# Patient Record
Sex: FEMALE | Race: WHITE | Employment: OTHER | URBAN - METROPOLITAN AREA
[De-identification: names, ages, dates, MRNs, and addresses within clinical notes are randomized per-mention and may not be internally consistent; named-entity substitution may affect disease eponyms.]

---

## 2019-04-02 ENCOUNTER — TELEPHONE (OUTPATIENT)
Dept: PULMONOLOGY | Age: 69
End: 2019-04-02

## 2019-04-02 ENCOUNTER — OFFICE VISIT (OUTPATIENT)
Dept: PULMONOLOGY | Age: 69
End: 2019-04-02
Payer: MEDICARE

## 2019-04-02 VITALS
HEIGHT: 63 IN | WEIGHT: 244 LBS | BODY MASS INDEX: 43.23 KG/M2 | OXYGEN SATURATION: 95 % | RESPIRATION RATE: 20 BRPM | SYSTOLIC BLOOD PRESSURE: 132 MMHG | DIASTOLIC BLOOD PRESSURE: 84 MMHG | HEART RATE: 77 BPM

## 2019-04-02 DIAGNOSIS — Z87.891 PERSONAL HISTORY OF TOBACCO USE: ICD-10-CM

## 2019-04-02 DIAGNOSIS — R06.09 DYSPNEA ON EXERTION: ICD-10-CM

## 2019-04-02 DIAGNOSIS — R91.1 PULMONARY NODULE: Primary | ICD-10-CM

## 2019-04-02 DIAGNOSIS — R91.8 PULMONARY NODULES: Primary | ICD-10-CM

## 2019-04-02 PROCEDURE — 99204 OFFICE O/P NEW MOD 45 MIN: CPT | Performed by: INTERNAL MEDICINE

## 2019-04-02 PROCEDURE — G8417 CALC BMI ABV UP PARAM F/U: HCPCS | Performed by: INTERNAL MEDICINE

## 2019-04-02 PROCEDURE — 3017F COLORECTAL CA SCREEN DOC REV: CPT | Performed by: INTERNAL MEDICINE

## 2019-04-02 PROCEDURE — 1090F PRES/ABSN URINE INCON ASSESS: CPT | Performed by: INTERNAL MEDICINE

## 2019-04-02 PROCEDURE — G8427 DOCREV CUR MEDS BY ELIG CLIN: HCPCS | Performed by: INTERNAL MEDICINE

## 2019-04-02 NOTE — TELEPHONE ENCOUNTER
Need to obtain imagining from [address]    4/2/19    Assessment:       · RML 8 mm nodule on CT 2/27/19. Grown from CT 11/27/17, however not significantly changed between CT 12/4/18 and PET scan 3/23/19. Negative PET scan 3/23/19- SUV 1. DDx growing granuloma, slow growing bronchogenic carcinoma, metastatic disease. Discussed with IR and TTNBx will be challenging and carries a low yield. · Dyspnea on exertion. Obesity and deconditioning are contributing  · History of L breast cancer 7-10 years ago underwent surgery followed by radiation. · 20 pack year smoking- quit 2015       Plan:       · PFTs and 6MW  · Options of Bx, resection and watchful waiting were discussed with patient. I recommend radiographic follow up CT chest CT chest in 3 months.    · Albuterol 2 puffs Q4-6 hrs PRN

## 2019-04-02 NOTE — COMMUNICATION BODY
Assessment:       · RML 8 mm nodule on CT 2/27/19. Grown from CT 11/27/17, however not significantly changed between CT 12/4/18 and PET scan 3/23/19. Negative PET scan 3/23/19- SUV 1. DDx growing granuloma, slow growing bronchogenic carcinoma, metastatic disease. Discussed with IR and TTNBx will be challenging and carries a low yield. · Dyspnea on exertion. Obesity and deconditioning are contributing  · History of L breast cancer 7-10 years ago underwent surgery followed by radiation. · 20 pack year smoking- quit 2015       Plan:       · PFTs and 6MW  · Options of Bx, resection and watchful waiting were discussed with patient. I recommend radiographic follow up CT chest CT chest in 3 months.    · Albuterol 2 puffs Q4-6 hrs PRN

## 2019-04-02 NOTE — PROGRESS NOTES
[facility]                                                                    CHIEF COMPLAINT: Pulmonary nodule    Consulting provider: Dr. Alex Galicia    HPI:   80years old female had CT chest 12/4/2018 for SOB. CT reviewed by me and showed growing RML nodule. Followed by PET scan 3/23/19 reviewed by me and showed no metabolic activities. History of L breast cancer 7-10 years ago underwent surgery followed by radiation. Quit smoking 4 years ago. Smoked 1 ppd for 20 years. No weight loss. Uses Albuterol PRN. Mild CHILDS, able to walk as far as she wants. Dyspnea worse with exertion and better with resting. No associated cough or wheezes. Past Medical History:   Diagnosis Date    Breast cancer ([address]     Hypercholesteremia        Past Surgical History:        Procedure Laterality Date    KNEE SURGERY Right     THYROIDECTOMY      TOTAL KNEE ARTHROPLASTY  2015       Allergies:  has No Known Allergies. Social History:    TOBACCO:   reports that she quit smoking about 4 years ago. She has a 20.00 pack-year smoking history. She has never used smokeless tobacco.  ETOH:   has no alcohol history on file. Family History:       Problem Relation Age of Onset    Cancer Mother         mutiple myeloma    Cancer Father         colon    Asthma Neg Hx     Diabetes Neg Hx     Emphysema Neg Hx     Heart Failure Neg Hx        Current Medications:  No current outpatient medications on file.       REVIEW OF SYSTEMS:  Constitutional: Negative for fever  HENT: Negative for sore throat  Eyes: Negative for redness   Respiratory: Negative for dyspnea, cough  Cardiovascular: Negative for chest pain  Gastrointestinal: Negative for vomiting, diarrhea   Genitourinary: Negative for hematuria   Musculoskeletal: Negative for arthralgias   Skin: Negative for rash  Neurological: Negative for syncope  Hematological: Negative for adenopathy  Psychiatric/Behavorial: Negative for anxiety      Objective:   PHYSICAL EXAM:    Blood pressure 132/84, pulse 77, resp. rate 20, height 5' 3\" (1.6 m), weight 244 lb (110.7 kg), SpO2 95 %.' on RA  Gen: No distress. Eyes: PERRL. No sclera icterus. No conjunctival injection. ENT: No discharge. Pharynx clear. Neck: Trachea midline. No obvious mass. Resp: No accessory muscle use. No crackles. No wheezes. No rhonchi. No dullness on percussion. Good air entry. CV: Regular rate. Regular rhythm. No murmur or rub. No edema. GI: Non-tender. Non-distended. No hernia. Skin: Warm and dry. No nodule on exposed extremities. Lymph: No cervical LAD. No supraclavicular LAD. M/S: No cyanosis. No joint deformity. No clubbing. Neuro: Awake. Alert. Moves all four extremities. Psych: Oriented x 3. No anxiety. DATA reviewed by me:   CT chest 12/4/18 imaging reviewed by me and showed  Increased size of 7 mm right middle lobe  3 mm right lower lobe  Lingula opacity scarring versus atelectasis    PET scan 3/23/19 imaging reviewed by me and showed  RUL 8 mm nodule with no significant metabolic activities    Assessment:       · RML 8 mm nodule on CT 2/27/19. Grown from CT 11/27/17, however not significantly changed between CT 12/4/18 and PET scan 3/23/19. Negative PET scan 3/23/19- SUV 1. DDx growing granuloma, slow growing bronchogenic carcinoma, metastatic disease. Discussed with IR and TTNBx will be challenging and carries a low yield. · Dyspnea on exertion. Obesity and deconditioning are contributing  · History of L breast cancer 7-10 years ago underwent surgery followed by radiation. · 20 pack year smoking- quit 2015       Plan:       · PFTs and 6MW  · Options of Bx, resection and watchful waiting were discussed with patient. I recommend radiographic follow up CT chest CT chest in 3 months.    · Albuterol 2 puffs Q4-6 hrs PRN

## 2019-04-03 RX ORDER — ALBUTEROL SULFATE 90 UG/1
2 AEROSOL, METERED RESPIRATORY (INHALATION) EVERY 6 HOURS PRN
Qty: 1 INHALER | Refills: 6 | Status: SHIPPED | OUTPATIENT
Start: 2019-04-03 | End: 2020-11-04

## 2019-04-10 ENCOUNTER — HOSPITAL ENCOUNTER (OUTPATIENT)
Dept: PULMONOLOGY | Age: 69
Discharge: HOME | End: 2019-04-10
Payer: MEDICARE

## 2019-04-10 DIAGNOSIS — R91.8 PULMONARY NODULES: ICD-10-CM

## 2019-04-10 PROCEDURE — 94726 PLETHYSMOGRAPHY LUNG VOLUMES: CPT

## 2019-04-10 PROCEDURE — 94640 AIRWAY INHALATION TREATMENT: CPT

## 2019-04-10 PROCEDURE — 94618 PULMONARY STRESS TESTING: CPT

## 2019-04-10 PROCEDURE — 6360000002 HC RX W HCPCS: Performed by: INTERNAL MEDICINE

## 2019-04-10 PROCEDURE — 94060 EVALUATION OF WHEEZING: CPT

## 2019-04-10 PROCEDURE — 94729 DIFFUSING CAPACITY: CPT

## 2019-04-10 RX ORDER — ALBUTEROL SULFATE 2.5 MG/3ML
2.5 SOLUTION RESPIRATORY (INHALATION) ONCE
Status: COMPLETED | OUTPATIENT
Start: 2019-04-10 | End: 2019-04-10

## 2019-04-10 RX ADMIN — ALBUTEROL SULFATE 2.5 MG: 2.5 SOLUTION RESPIRATORY (INHALATION) at 09:54

## 2019-04-11 NOTE — PROCEDURES
[address]                 [address] [id number]                               PULMONARY FUNCTION    PATIENT NAME: Win Norris                :        1950  MED REC NO:   [id number]                          ROOM:  ACCOUNT NO:   [de-identified]                           ADMIT DATE: 04/10/2019  PROVIDER:     Kirsten Carrizales MD    DATE OF PROCEDURE:  04/10/2019    INDICATION:  Pulmonary nodule. FINDINGS:  1. Spirometry: The FEV1 is 1.59 liters, which is 67% of predicted. The FEV1/FVC ratio is 0.7. Inhaled bronchodilators are given. There is  no significant improvement. 2.  Lung volumes: Total lung capacity is normal at 4.53 liters or 89%  of predicted. Mild air trapping is present. 3.  Diffusion capacity:  DLCO is 18.01 mL/min/mmHg, which is 80% of  predicted. 4.  Flow volume loop shows an obstructive pattern. 5.  Six-minute walk test per [facility]. Baseline oxygen  saturation is 97%. Lowest oxygen saturation is 90%. The patient  ambulated 1260 feet. IMPRESSION:  1. Moderate obstructive lung defect is present along with air trapping. 2.  Diffusion capacity is at the lower limit of normal.  3.  There is significant oxyhemoglobin desaturation on six-minute walk  testing, but supplemental oxygen is not required.         Ceci Root MD    D: 04/10/2019 17:26:33       T: 04/10/2019 21:35:54     [id number]  Job#: [id number]     Doc#: [id number]    CC:

## 2019-06-20 ENCOUNTER — TELEPHONE (OUTPATIENT)
Dept: PULMONOLOGY | Age: 69
End: 2019-06-20

## 2019-06-26 ENCOUNTER — TELEPHONE (OUTPATIENT)
Dept: PULMONOLOGY | Age: 69
End: 2019-06-26

## 2019-06-26 ENCOUNTER — OFFICE VISIT (OUTPATIENT)
Dept: PULMONOLOGY | Age: 69
End: 2019-06-26
Payer: MEDICARE

## 2019-06-26 VITALS
BODY MASS INDEX: 42.51 KG/M2 | HEART RATE: 72 BPM | TEMPERATURE: 97.5 F | HEIGHT: 64 IN | OXYGEN SATURATION: 95 % | SYSTOLIC BLOOD PRESSURE: 134 MMHG | RESPIRATION RATE: 22 BRPM | DIASTOLIC BLOOD PRESSURE: 82 MMHG | WEIGHT: 249 LBS

## 2019-06-26 DIAGNOSIS — R06.09 DYSPNEA ON EXERTION: ICD-10-CM

## 2019-06-26 DIAGNOSIS — J44.9 MODERATE COPD (CHRONIC OBSTRUCTIVE PULMONARY DISEASE) (HCC): ICD-10-CM

## 2019-06-26 DIAGNOSIS — R91.1 PULMONARY NODULE: Primary | ICD-10-CM

## 2019-06-26 PROCEDURE — 4040F PNEUMOC VAC/ADMIN/RCVD: CPT | Performed by: INTERNAL MEDICINE

## 2019-06-26 PROCEDURE — G8926 SPIRO NO PERF OR DOC: HCPCS | Performed by: INTERNAL MEDICINE

## 2019-06-26 PROCEDURE — G8417 CALC BMI ABV UP PARAM F/U: HCPCS | Performed by: INTERNAL MEDICINE

## 2019-06-26 PROCEDURE — 1090F PRES/ABSN URINE INCON ASSESS: CPT | Performed by: INTERNAL MEDICINE

## 2019-06-26 PROCEDURE — 1036F TOBACCO NON-USER: CPT | Performed by: INTERNAL MEDICINE

## 2019-06-26 PROCEDURE — 1123F ACP DISCUSS/DSCN MKR DOCD: CPT | Performed by: INTERNAL MEDICINE

## 2019-06-26 PROCEDURE — 3017F COLORECTAL CA SCREEN DOC REV: CPT | Performed by: INTERNAL MEDICINE

## 2019-06-26 PROCEDURE — 99214 OFFICE O/P EST MOD 30 MIN: CPT | Performed by: INTERNAL MEDICINE

## 2019-06-26 PROCEDURE — G8400 PT W/DXA NO RESULTS DOC: HCPCS | Performed by: INTERNAL MEDICINE

## 2019-06-26 PROCEDURE — 3023F SPIROM DOC REV: CPT | Performed by: INTERNAL MEDICINE

## 2019-06-26 PROCEDURE — G8427 DOCREV CUR MEDS BY ELIG CLIN: HCPCS | Performed by: INTERNAL MEDICINE

## 2019-06-26 NOTE — PROGRESS NOTES
[facility]                                                                    CHIEF COMPLAINT: Follow-up CT chest        HPI:   CT chest and PFTs reviewed by me and noted below. Results were dicussed with patient and multiple good questions were answered. Uses Albuterol BID         From prior visit:   80years old female had CT chest 12/4/2018 for SOB. CT reviewed by me and showed growing RML nodule. Followed by PET scan 3/23/19 reviewed by me and showed no metabolic activities. History of L breast cancer 7-10 years ago underwent surgery followed by radiation. Quit smoking 4 years ago. Smoked 1 ppd for 20 years. No weight loss. Uses Albuterol PRN. Mild CHILDS, able to walk as far as she wants. Dyspnea worse with exertion and better with resting. No associated cough or wheezes. Past Medical History:   Diagnosis Date    Breast cancer ([address]     Hypercholesteremia        Past Surgical History:        Procedure Laterality Date    KNEE SURGERY Right     THYROIDECTOMY      TOTAL KNEE ARTHROPLASTY  2015       Allergies:  has No Known Allergies. Social History:    TOBACCO:   reports that she quit smoking about 4 years ago. She has a 20.00 pack-year smoking history. She has never used smokeless tobacco.  ETOH:   has no alcohol history on file. Family History:       Problem Relation Age of Onset    Cancer Mother         mutiple myeloma    Cancer Father         colon    Asthma Neg Hx     Diabetes Neg Hx     Emphysema Neg Hx     Heart Failure Neg Hx        Current Medications:    Current Outpatient Medications:     albuterol sulfate HFA (VENTOLIN HFA) 108 (90 Base) MCG/ACT inhaler, Inhale 2 puffs into the lungs every 6 hours as needed for Wheezing or Shortness of Breath, Disp: 1 Inhaler, Rfl: 6          Objective:   PHYSICAL EXAM:    Blood pressure (!) 136/96, pulse 72, temperature 97.5 °F (36.4 °C), temperature source Oral, resp. rate 22, height 5' 4\" (1.626 m), weight 249 lb (112.9 kg), SpO2 95 %.' on RA  Gen: No distress. Eyes: PERRL. No sclera icterus. No conjunctival injection. ENT: No discharge. Pharynx clear. Neck: Trachea midline. No obvious mass. Resp: No accessory muscle use. No crackles. No wheezes. No rhonchi. No dullness on percussion. Good air entry. CV: Regular rate. Regular rhythm. No murmur or rub. No edema. GI: Non-tender. Non-distended. No hernia. Skin: Warm and dry. No nodule on exposed extremities. Lymph: No cervical LAD. No supraclavicular LAD. M/S: No cyanosis. No joint deformity. No clubbing. Neuro: Awake. Alert. Moves all four extremities. Psych: Oriented x 3. No anxiety. DATA reviewed by me:   PFTs 04/11/19 FVC (%) FEV1 1.59 (67 %) FEV1/FVC 70% TLC 4.53 (89 %) DLCO 18.01 (80 %) 6MW 1260 F low O2 90%      CT chest 12/4/18   Increased size of 7 mm right middle lobe  3 mm right lower lobe  Lingula opacity scarring versus atelectasis    PET scan 3/23/19  RUL 8 mm nodule with no significant metabolic activities    CT chest 6/13/2019 imaging reviewed by me and showed  No evidence of adenopathy  RML 9 mm nodule, increased in size from CT 2/27/2019      Assessment:       · RML 9 mm nodule on CT 2/27/19. Grown from CT 11/27/17, however not significantly changed between CT 12/4/18 and PET scan 3/23/19. Negative PET scan 3/23/19- SUV 1. Growing on repeat CT 6/13/2019. Discussed with IR and TTNBx will be challenging and carries a low yield. · Moderate COPD  · Dyspnea on exertion. Obesity and deconditioning are contributing  · History of L breast cancer 7-10 years ago underwent surgery followed by radiation.    · 20 pack year smoking- quit 2015       Plan:       · CT surgery evaluation for resection given growth from recent CT chest  · Albuterol 2 puffs Q4-6 hrs PRN  · Trial of Spiriva Respimat: Two inhalations (5 mcg) once daily (maximum: 2 inhalations per 24 hours)  · D/W Dr. Max Ibanez

## 2019-07-01 ENCOUNTER — OFFICE VISIT (OUTPATIENT)
Dept: CARDIOTHORACIC SURGERY | Age: 69
End: 2019-07-01
Payer: MEDICARE

## 2019-07-01 VITALS
SYSTOLIC BLOOD PRESSURE: 134 MMHG | DIASTOLIC BLOOD PRESSURE: 80 MMHG | BODY MASS INDEX: 43.19 KG/M2 | TEMPERATURE: 97.8 F | WEIGHT: 253 LBS | HEIGHT: 64 IN | OXYGEN SATURATION: 94 % | HEART RATE: 82 BPM

## 2019-07-01 DIAGNOSIS — R91.1 NODULE OF MIDDLE LOBE OF RIGHT LUNG: Primary | ICD-10-CM

## 2019-07-01 PROCEDURE — G8417 CALC BMI ABV UP PARAM F/U: HCPCS | Performed by: THORACIC SURGERY (CARDIOTHORACIC VASCULAR SURGERY)

## 2019-07-01 PROCEDURE — 1036F TOBACCO NON-USER: CPT | Performed by: THORACIC SURGERY (CARDIOTHORACIC VASCULAR SURGERY)

## 2019-07-01 PROCEDURE — 3017F COLORECTAL CA SCREEN DOC REV: CPT | Performed by: THORACIC SURGERY (CARDIOTHORACIC VASCULAR SURGERY)

## 2019-07-01 PROCEDURE — 4040F PNEUMOC VAC/ADMIN/RCVD: CPT | Performed by: THORACIC SURGERY (CARDIOTHORACIC VASCULAR SURGERY)

## 2019-07-01 PROCEDURE — 1123F ACP DISCUSS/DSCN MKR DOCD: CPT | Performed by: THORACIC SURGERY (CARDIOTHORACIC VASCULAR SURGERY)

## 2019-07-01 PROCEDURE — G8400 PT W/DXA NO RESULTS DOC: HCPCS | Performed by: THORACIC SURGERY (CARDIOTHORACIC VASCULAR SURGERY)

## 2019-07-01 PROCEDURE — 1090F PRES/ABSN URINE INCON ASSESS: CPT | Performed by: THORACIC SURGERY (CARDIOTHORACIC VASCULAR SURGERY)

## 2019-07-01 PROCEDURE — 99202 OFFICE O/P NEW SF 15 MIN: CPT | Performed by: THORACIC SURGERY (CARDIOTHORACIC VASCULAR SURGERY)

## 2019-07-01 PROCEDURE — G8427 DOCREV CUR MEDS BY ELIG CLIN: HCPCS | Performed by: THORACIC SURGERY (CARDIOTHORACIC VASCULAR SURGERY)

## 2019-07-01 NOTE — PROGRESS NOTES
Consultation H&P        Date of Admission:  No admission date for patient encounter. Date of Consultation:  7/1/2019    PCP:  Dyana Rae      Chief Complaint:     History of Present Illness: We are asked to see this patient in consultation by Dr. huff  Nestor Garcia is a 80 y.o. female who history of smoking quit 4 years ago CT scan showed lung nodule which progressively increase in size since 2017 asymptomatic     Past Medical History:  Past Medical History:   Diagnosis Date    Breast cancer ([facility] [address] 2008    radiation and lumpectomy    Hypercholesteremia        Past Surgical History:  Past Surgical History:   Procedure Laterality Date    KNEE SURGERY Right     SAPHENOUS VEIN ABLATION      kidney ablation    THYROIDECTOMY      TOTAL KNEE ARTHROPLASTY  2015       Home Medications:   Prior to Admission medications    Medication Sig Start Date End Date Taking? Authorizing Provider   tiotropium (SPIRIVA RESPIMAT) 2.5 MCG/ACT AERS inhaler Inhale 2 puffs into the lungs daily 6/26/19  Yes Vitaliy Del Cid MD   albuterol sulfate HFA (VENTOLIN HFA) 108 (90 Base) MCG/ACT inhaler Inhale 2 puffs into the lungs every 6 hours as needed for Wheezing or Shortness of Breath 4/3/19  Yes Vitaliy Del Cid MD        Facility Administered Medications:      Allergies:  No Known Allergies     Social History:    Working:   Caffeine:   Lifestyle:    Social History     Socioeconomic History    Marital status:      Spouse name: Not on file    Number of children: Not on file    Years of education: Not on file    Highest education level: Not on file   Occupational History    Not on file   Social Needs    Financial resource strain: Not on file    Food insecurity:     Worry: Not on file     Inability: Not on file    Transportation needs:     Medical: Not on file     Non-medical: Not on file   Tobacco Use    Smoking status: Former Smoker     Packs/day: 1.00     Years: 20.00     Pack years: 20.00     Last Fritz Yee, UROBILINOGEN, BILIRUBINUR, BLOODU, GLUCOSEU, AMORPHOUS    History obtained: chart, pt    Risk factors: Smoker, middle lobe lung nodule progressively increase in size,      Diagnosis: Lobe lung nodule    Plan: Discussed with the patient in detail although her nodule is 0.9 mm doubled in size since 2017 I think the better choice of his middle lobectomy with mediastinal lymphadenectomy of its cancer  PFT acceptable for lung resection  Cardiology clearance, stress test      Typical periop/postop course reviewed including initial limitations on driving/heavy lifting. Risks, benefits and postoperative complications discussed including bleeding, infection, stroke, death, postop pulmonary and renal issues.     Denisse Braxton MD FACS

## 2019-07-02 ENCOUNTER — TELEPHONE (OUTPATIENT)
Dept: CARDIOTHORACIC SURGERY | Age: 69
End: 2019-07-02

## 2019-07-02 DIAGNOSIS — R91.1 NODULE OF MIDDLE LOBE OF RIGHT LUNG: Primary | ICD-10-CM

## 2019-07-02 RX ORDER — AMIODARONE HYDROCHLORIDE 200 MG/1
400 TABLET ORAL 2 TIMES DAILY
Qty: 8 TABLET | Refills: 0 | Status: ON HOLD | OUTPATIENT
Start: 2019-07-02 | End: 2019-07-25 | Stop reason: HOSPADM

## 2019-07-02 NOTE — TELEPHONE ENCOUNTER
Spoke with patient in the office regarding surgery scheduled for 7/23/2019 @ 9:00 am with arrival time of 7:00 am @ [facility] with Dr. Ramona Marques to include: right video assisted thoracoscopic surgery, possible thoracotomy with right middle lobe lobectomy and mediastinal lymphadenectomy. Patient to complete pre-admit lab work 7-10 days prior to surgery with the nurse from the hospital calling to coordinate. Patient knows not to eat or drink after midnight the day of surgery, take only heart related medications with a sip of water the morning of surgery and to call me with any questions or concerns.

## 2019-07-10 ENCOUNTER — HOSPITAL ENCOUNTER (OUTPATIENT)
Dept: PREADMISSION TESTING | Age: 69
Discharge: HOME | End: 2019-07-14
Payer: MEDICARE

## 2019-07-10 ENCOUNTER — HOSPITAL ENCOUNTER (OUTPATIENT)
Dept: GENERAL RADIOLOGY | Age: 69
Discharge: HOME | End: 2019-07-10
Payer: MEDICARE

## 2019-07-10 DIAGNOSIS — Z01.818 PREOP EXAMINATION: ICD-10-CM

## 2019-07-10 LAB
ABO/RH: NORMAL
ANION GAP SERPL CALCULATED.3IONS-SCNC: 12 MMOL/L (ref 3–16)
ANTIBODY SCREEN: NORMAL
APTT: 35.7 SEC (ref 26–36)
BASOPHILS ABSOLUTE: 0 K/UL (ref 0–0.2)
BASOPHILS RELATIVE PERCENT: 0.4 %
BILIRUBIN URINE: NEGATIVE
BLOOD, URINE: ABNORMAL
BUN BLDV-MCNC: 19 MG/DL (ref 7–20)
CALCIUM SERPL-MCNC: 9.3 MG/DL (ref 8.3–10.6)
CHLORIDE BLD-SCNC: 107 MMOL/L (ref 99–110)
CLARITY: CLEAR
CO2: 23 MMOL/L (ref 21–32)
COLOR: YELLOW
CREAT SERPL-MCNC: 0.8 MG/DL (ref 0.6–1.2)
EOSINOPHILS ABSOLUTE: 0.1 K/UL (ref 0–0.6)
EOSINOPHILS RELATIVE PERCENT: 2.1 %
EPITHELIAL CELLS, UA: ABNORMAL /HPF
GFR AFRICAN AMERICAN: >60
GFR NON-AFRICAN AMERICAN: >60
GLUCOSE BLD-MCNC: 100 MG/DL (ref 70–99)
GLUCOSE URINE: NEGATIVE MG/DL
HCT VFR BLD CALC: 45.5 % (ref 36–48)
HEMOGLOBIN: 15.1 G/DL (ref 12–16)
INR BLD: 0.97 (ref 0.86–1.14)
KETONES, URINE: NEGATIVE MG/DL
LEUKOCYTE ESTERASE, URINE: NEGATIVE
LYMPHOCYTES ABSOLUTE: 1.4 K/UL (ref 1–5.1)
LYMPHOCYTES RELATIVE PERCENT: 19.3 %
MCH RBC QN AUTO: 30.4 PG (ref 26–34)
MCHC RBC AUTO-ENTMCNC: 33.2 G/DL (ref 31–36)
MCV RBC AUTO: 91.7 FL (ref 80–100)
MICROSCOPIC EXAMINATION: YES
MONOCYTES ABSOLUTE: 0.5 K/UL (ref 0–1.3)
MONOCYTES RELATIVE PERCENT: 7.1 %
NEUTROPHILS ABSOLUTE: 5.1 K/UL (ref 1.7–7.7)
NEUTROPHILS RELATIVE PERCENT: 71.1 %
NITRITE, URINE: NEGATIVE
PDW BLD-RTO: 13.9 % (ref 12.4–15.4)
PH UA: 5.5 (ref 5–8)
PLATELET # BLD: 205 K/UL (ref 135–450)
PMV BLD AUTO: 9.1 FL (ref 5–10.5)
POTASSIUM SERPL-SCNC: 4.7 MMOL/L (ref 3.5–5.1)
PROTEIN UA: NEGATIVE MG/DL
PROTHROMBIN TIME: 11.1 SEC (ref 9.8–13)
RBC # BLD: 4.96 M/UL (ref 4–5.2)
RBC UA: ABNORMAL /HPF (ref 0–2)
SODIUM BLD-SCNC: 142 MMOL/L (ref 136–145)
SPECIFIC GRAVITY UA: 1.02 (ref 1–1.03)
URINE REFLEX TO CULTURE: ABNORMAL
URINE TYPE: ABNORMAL
UROBILINOGEN, URINE: 0.2 E.U./DL
WBC # BLD: 7.1 K/UL (ref 4–11)
WBC UA: ABNORMAL /HPF (ref 0–5)

## 2019-07-10 PROCEDURE — 93005 ELECTROCARDIOGRAM TRACING: CPT

## 2019-07-10 PROCEDURE — 81001 URINALYSIS AUTO W/SCOPE: CPT

## 2019-07-10 PROCEDURE — 36415 COLL VENOUS BLD VENIPUNCTURE: CPT

## 2019-07-10 PROCEDURE — 71046 X-RAY EXAM CHEST 2 VIEWS: CPT

## 2019-07-10 PROCEDURE — 86901 BLOOD TYPING SEROLOGIC RH(D): CPT

## 2019-07-10 PROCEDURE — 86900 BLOOD TYPING SEROLOGIC ABO: CPT

## 2019-07-10 PROCEDURE — 85730 THROMBOPLASTIN TIME PARTIAL: CPT

## 2019-07-10 PROCEDURE — 86850 RBC ANTIBODY SCREEN: CPT

## 2019-07-10 PROCEDURE — 85025 COMPLETE CBC W/AUTO DIFF WBC: CPT

## 2019-07-10 PROCEDURE — 80048 BASIC METABOLIC PNL TOTAL CA: CPT

## 2019-07-10 PROCEDURE — 85610 PROTHROMBIN TIME: CPT

## 2019-07-12 LAB
EKG ATRIAL RATE: 69 BPM
EKG DIAGNOSIS: NORMAL
EKG P AXIS: 70 DEGREES
EKG P-R INTERVAL: 160 MS
EKG Q-T INTERVAL: 400 MS
EKG QRS DURATION: 80 MS
EKG QTC CALCULATION (BAZETT): 428 MS
EKG R AXIS: 21 DEGREES
EKG T AXIS: 74 DEGREES
EKG VENTRICULAR RATE: 69 BPM

## 2019-07-16 ENCOUNTER — TELEPHONE (OUTPATIENT)
Dept: CARDIOTHORACIC SURGERY | Age: 69
End: 2019-07-16

## 2019-07-16 RX ORDER — BUDESONIDE AND FORMOTEROL FUMARATE DIHYDRATE 160; 4.5 UG/1; UG/1
2 AEROSOL RESPIRATORY (INHALATION) DAILY PRN
Status: ON HOLD | COMMUNITY
End: 2019-07-25 | Stop reason: CLARIF

## 2019-07-23 ENCOUNTER — ANESTHESIA (OUTPATIENT)
Dept: OPERATING ROOM | Age: 69
DRG: 165 | End: 2019-07-23
Payer: MEDICARE

## 2019-07-23 ENCOUNTER — APPOINTMENT (OUTPATIENT)
Dept: GENERAL RADIOLOGY | Age: 69
DRG: 165 | End: 2019-07-23
Attending: THORACIC SURGERY (CARDIOTHORACIC VASCULAR SURGERY)
Payer: MEDICARE

## 2019-07-23 ENCOUNTER — HOSPITAL ENCOUNTER (INPATIENT)
Age: 69
LOS: 2 days | Discharge: HOME | DRG: 165 | End: 2019-07-25
Attending: THORACIC SURGERY (CARDIOTHORACIC VASCULAR SURGERY) | Admitting: THORACIC SURGERY (CARDIOTHORACIC VASCULAR SURGERY)
Payer: MEDICARE

## 2019-07-23 ENCOUNTER — ANESTHESIA EVENT (OUTPATIENT)
Dept: OPERATING ROOM | Age: 69
DRG: 165 | End: 2019-07-23
Payer: MEDICARE

## 2019-07-23 VITALS
OXYGEN SATURATION: 100 % | SYSTOLIC BLOOD PRESSURE: 127 MMHG | TEMPERATURE: 96.8 F | DIASTOLIC BLOOD PRESSURE: 100 MMHG | RESPIRATION RATE: 14 BRPM

## 2019-07-23 DIAGNOSIS — R91.1 LUNG NODULE: ICD-10-CM

## 2019-07-23 DIAGNOSIS — G89.18 ACUTE POSTOPERATIVE PAIN: Primary | ICD-10-CM

## 2019-07-23 PROBLEM — R91.8 MASS OF RIGHT LUNG: Status: ACTIVE | Noted: 2019-07-23

## 2019-07-23 LAB
ABO/RH: NORMAL
ANTIBODY SCREEN: NORMAL

## 2019-07-23 PROCEDURE — 6360000002 HC RX W HCPCS

## 2019-07-23 PROCEDURE — 94761 N-INVAS EAR/PLS OXIMETRY MLT: CPT

## 2019-07-23 PROCEDURE — 3600000005 HC SURGERY LEVEL 5 BASE: Performed by: THORACIC SURGERY (CARDIOTHORACIC VASCULAR SURGERY)

## 2019-07-23 PROCEDURE — 88341 IMHCHEM/IMCYTCHM EA ADD ANTB: CPT

## 2019-07-23 PROCEDURE — 6370000000 HC RX 637 (ALT 250 FOR IP): Performed by: THORACIC SURGERY (CARDIOTHORACIC VASCULAR SURGERY)

## 2019-07-23 PROCEDURE — 99024 POSTOP FOLLOW-UP VISIT: CPT | Performed by: THORACIC SURGERY (CARDIOTHORACIC VASCULAR SURGERY)

## 2019-07-23 PROCEDURE — 86900 BLOOD TYPING SEROLOGIC ABO: CPT

## 2019-07-23 PROCEDURE — 99999 PR OFFICE/OUTPT VISIT,PROCEDURE ONLY: CPT | Performed by: THORACIC SURGERY (CARDIOTHORACIC VASCULAR SURGERY)

## 2019-07-23 PROCEDURE — 88309 TISSUE EXAM BY PATHOLOGIST: CPT

## 2019-07-23 PROCEDURE — 88342 IMHCHEM/IMCYTCHM 1ST ANTB: CPT

## 2019-07-23 PROCEDURE — 3600000015 HC SURGERY LEVEL 5 ADDTL 15MIN: Performed by: THORACIC SURGERY (CARDIOTHORACIC VASCULAR SURGERY)

## 2019-07-23 PROCEDURE — 6360000002 HC RX W HCPCS: Performed by: NURSE ANESTHETIST, CERTIFIED REGISTERED

## 2019-07-23 PROCEDURE — 86901 BLOOD TYPING SEROLOGIC RH(D): CPT

## 2019-07-23 PROCEDURE — 94669 MECHANICAL CHEST WALL OSCILL: CPT

## 2019-07-23 PROCEDURE — 2500000003 HC RX 250 WO HCPCS: Performed by: THORACIC SURGERY (CARDIOTHORACIC VASCULAR SURGERY)

## 2019-07-23 PROCEDURE — 32674 THORACOSCOPY LYMPH NODE EXC: CPT | Performed by: THORACIC SURGERY (CARDIOTHORACIC VASCULAR SURGERY)

## 2019-07-23 PROCEDURE — 86850 RBC ANTIBODY SCREEN: CPT

## 2019-07-23 PROCEDURE — 6360000002 HC RX W HCPCS: Performed by: THORACIC SURGERY (CARDIOTHORACIC VASCULAR SURGERY)

## 2019-07-23 PROCEDURE — 2720000010 HC SURG SUPPLY STERILE: Performed by: THORACIC SURGERY (CARDIOTHORACIC VASCULAR SURGERY)

## 2019-07-23 PROCEDURE — 32663 THORACOSCOPY W/LOBECTOMY: CPT | Performed by: THORACIC SURGERY (CARDIOTHORACIC VASCULAR SURGERY)

## 2019-07-23 PROCEDURE — 2580000003 HC RX 258: Performed by: ANESTHESIOLOGY

## 2019-07-23 PROCEDURE — 2709999900 HC NON-CHARGEABLE SUPPLY: Performed by: THORACIC SURGERY (CARDIOTHORACIC VASCULAR SURGERY)

## 2019-07-23 PROCEDURE — 3700000001 HC ADD 15 MINUTES (ANESTHESIA): Performed by: THORACIC SURGERY (CARDIOTHORACIC VASCULAR SURGERY)

## 2019-07-23 PROCEDURE — 2000000000 HC ICU R&B

## 2019-07-23 PROCEDURE — 2500000003 HC RX 250 WO HCPCS: Performed by: NURSE ANESTHETIST, CERTIFIED REGISTERED

## 2019-07-23 PROCEDURE — 6360000002 HC RX W HCPCS: Performed by: NURSE PRACTITIONER

## 2019-07-23 PROCEDURE — 88305 TISSUE EXAM BY PATHOLOGIST: CPT

## 2019-07-23 PROCEDURE — C1729 CATH, DRAINAGE: HCPCS | Performed by: THORACIC SURGERY (CARDIOTHORACIC VASCULAR SURGERY)

## 2019-07-23 PROCEDURE — C9290 INJ, BUPIVACAINE LIPOSOME: HCPCS | Performed by: THORACIC SURGERY (CARDIOTHORACIC VASCULAR SURGERY)

## 2019-07-23 PROCEDURE — 2700000000 HC OXYGEN THERAPY PER DAY

## 2019-07-23 PROCEDURE — 2580000003 HC RX 258: Performed by: THORACIC SURGERY (CARDIOTHORACIC VASCULAR SURGERY)

## 2019-07-23 PROCEDURE — 94640 AIRWAY INHALATION TREATMENT: CPT

## 2019-07-23 PROCEDURE — 3700000000 HC ANESTHESIA ATTENDED CARE: Performed by: THORACIC SURGERY (CARDIOTHORACIC VASCULAR SURGERY)

## 2019-07-23 PROCEDURE — 2580000003 HC RX 258: Performed by: NURSE ANESTHETIST, CERTIFIED REGISTERED

## 2019-07-23 PROCEDURE — 71045 X-RAY EXAM CHEST 1 VIEW: CPT

## 2019-07-23 PROCEDURE — 88313 SPECIAL STAINS GROUP 2: CPT

## 2019-07-23 RX ORDER — SODIUM CHLORIDE 0.9 % (FLUSH) 0.9 %
10 SYRINGE (ML) INJECTION PRN
Status: DISCONTINUED | OUTPATIENT
Start: 2019-07-23 | End: 2019-07-23 | Stop reason: HOSPADM

## 2019-07-23 RX ORDER — LIDOCAINE HYDROCHLORIDE 20 MG/ML
INJECTION, SOLUTION INFILTRATION; PERINEURAL PRN
Status: DISCONTINUED | OUTPATIENT
Start: 2019-07-23 | End: 2019-07-23 | Stop reason: SDUPTHER

## 2019-07-23 RX ORDER — KETOROLAC TROMETHAMINE 30 MG/ML
15 INJECTION, SOLUTION INTRAMUSCULAR; INTRAVENOUS EVERY 6 HOURS PRN
Status: DISCONTINUED | OUTPATIENT
Start: 2019-07-23 | End: 2019-07-25 | Stop reason: HOSPADM

## 2019-07-23 RX ORDER — SODIUM CHLORIDE 0.9 % (FLUSH) 0.9 %
10 SYRINGE (ML) INJECTION EVERY 12 HOURS SCHEDULED
Status: DISCONTINUED | OUTPATIENT
Start: 2019-07-23 | End: 2019-07-23 | Stop reason: HOSPADM

## 2019-07-23 RX ORDER — DOCUSATE SODIUM 100 MG/1
100 CAPSULE, LIQUID FILLED ORAL 2 TIMES DAILY
Status: DISCONTINUED | OUTPATIENT
Start: 2019-07-23 | End: 2019-07-25 | Stop reason: HOSPADM

## 2019-07-23 RX ORDER — MEPERIDINE HYDROCHLORIDE 50 MG/ML
12.5 INJECTION INTRAMUSCULAR; INTRAVENOUS; SUBCUTANEOUS EVERY 5 MIN PRN
Status: DISCONTINUED | OUTPATIENT
Start: 2019-07-23 | End: 2019-07-23 | Stop reason: HOSPADM

## 2019-07-23 RX ORDER — MORPHINE SULFATE 2 MG/ML
2 INJECTION, SOLUTION INTRAMUSCULAR; INTRAVENOUS
Status: DISCONTINUED | OUTPATIENT
Start: 2019-07-23 | End: 2019-07-25 | Stop reason: HOSPADM

## 2019-07-23 RX ORDER — DEXTROSE, SODIUM CHLORIDE, AND POTASSIUM CHLORIDE 5; .45; .15 G/100ML; G/100ML; G/100ML
INJECTION INTRAVENOUS CONTINUOUS
Status: DISCONTINUED | OUTPATIENT
Start: 2019-07-23 | End: 2019-07-24

## 2019-07-23 RX ORDER — MORPHINE SULFATE 4 MG/ML
4 INJECTION, SOLUTION INTRAMUSCULAR; INTRAVENOUS
Status: DISCONTINUED | OUTPATIENT
Start: 2019-07-23 | End: 2019-07-25 | Stop reason: HOSPADM

## 2019-07-23 RX ORDER — SODIUM CHLORIDE, SODIUM LACTATE, POTASSIUM CHLORIDE, CALCIUM CHLORIDE 600; 310; 30; 20 MG/100ML; MG/100ML; MG/100ML; MG/100ML
INJECTION, SOLUTION INTRAVENOUS CONTINUOUS PRN
Status: DISCONTINUED | OUTPATIENT
Start: 2019-07-23 | End: 2019-07-23 | Stop reason: SDUPTHER

## 2019-07-23 RX ORDER — ALBUTEROL SULFATE 2.5 MG/3ML
2.5 SOLUTION RESPIRATORY (INHALATION)
Status: DISCONTINUED | OUTPATIENT
Start: 2019-07-23 | End: 2019-07-25

## 2019-07-23 RX ORDER — ALBUTEROL SULFATE 90 UG/1
2 AEROSOL, METERED RESPIRATORY (INHALATION) EVERY 6 HOURS PRN
Status: DISCONTINUED | OUTPATIENT
Start: 2019-07-23 | End: 2019-07-25

## 2019-07-23 RX ORDER — LIDOCAINE HYDROCHLORIDE 10 MG/ML
0.3 INJECTION, SOLUTION EPIDURAL; INFILTRATION; INTRACAUDAL; PERINEURAL
Status: DISCONTINUED | OUTPATIENT
Start: 2019-07-23 | End: 2019-07-23 | Stop reason: HOSPADM

## 2019-07-23 RX ORDER — ACETAMINOPHEN 10 MG/ML
1000 INJECTION, SOLUTION INTRAVENOUS EVERY 8 HOURS
Status: COMPLETED | OUTPATIENT
Start: 2019-07-23 | End: 2019-07-24

## 2019-07-23 RX ORDER — MIDAZOLAM HYDROCHLORIDE 1 MG/ML
INJECTION INTRAMUSCULAR; INTRAVENOUS PRN
Status: DISCONTINUED | OUTPATIENT
Start: 2019-07-23 | End: 2019-07-23 | Stop reason: SDUPTHER

## 2019-07-23 RX ORDER — POLYETHYLENE GLYCOL 3350 17 G/17G
17 POWDER, FOR SOLUTION ORAL DAILY PRN
Status: DISCONTINUED | OUTPATIENT
Start: 2019-07-23 | End: 2019-07-25

## 2019-07-23 RX ORDER — SODIUM CHLORIDE, SODIUM LACTATE, POTASSIUM CHLORIDE, CALCIUM CHLORIDE 600; 310; 30; 20 MG/100ML; MG/100ML; MG/100ML; MG/100ML
INJECTION, SOLUTION INTRAVENOUS CONTINUOUS
Status: DISCONTINUED | OUTPATIENT
Start: 2019-07-23 | End: 2019-07-23

## 2019-07-23 RX ORDER — SODIUM CHLORIDE 0.9 % (FLUSH) 0.9 %
10 SYRINGE (ML) INJECTION EVERY 12 HOURS SCHEDULED
Status: DISCONTINUED | OUTPATIENT
Start: 2019-07-23 | End: 2019-07-25 | Stop reason: HOSPADM

## 2019-07-23 RX ORDER — PROPOFOL 10 MG/ML
INJECTION, EMULSION INTRAVENOUS PRN
Status: DISCONTINUED | OUTPATIENT
Start: 2019-07-23 | End: 2019-07-23 | Stop reason: SDUPTHER

## 2019-07-23 RX ORDER — ONDANSETRON 2 MG/ML
4 INJECTION INTRAMUSCULAR; INTRAVENOUS EVERY 10 MIN PRN
Status: DISCONTINUED | OUTPATIENT
Start: 2019-07-23 | End: 2019-07-23 | Stop reason: HOSPADM

## 2019-07-23 RX ORDER — OXYCODONE HYDROCHLORIDE AND ACETAMINOPHEN 5; 325 MG/1; MG/1
1 TABLET ORAL PRN
Status: DISCONTINUED | OUTPATIENT
Start: 2019-07-23 | End: 2019-07-23 | Stop reason: HOSPADM

## 2019-07-23 RX ORDER — OXYCODONE HYDROCHLORIDE 5 MG/1
10 TABLET ORAL EVERY 4 HOURS PRN
Status: DISCONTINUED | OUTPATIENT
Start: 2019-07-23 | End: 2019-07-25 | Stop reason: HOSPADM

## 2019-07-23 RX ORDER — ACETAMINOPHEN 10 MG/ML
INJECTION, SOLUTION INTRAVENOUS
Status: COMPLETED
Start: 2019-07-23 | End: 2019-07-23

## 2019-07-23 RX ORDER — SODIUM CHLORIDE 0.9 % (FLUSH) 0.9 %
10 SYRINGE (ML) INJECTION PRN
Status: DISCONTINUED | OUTPATIENT
Start: 2019-07-23 | End: 2019-07-25 | Stop reason: HOSPADM

## 2019-07-23 RX ORDER — ONDANSETRON 2 MG/ML
INJECTION INTRAMUSCULAR; INTRAVENOUS PRN
Status: DISCONTINUED | OUTPATIENT
Start: 2019-07-23 | End: 2019-07-23 | Stop reason: SDUPTHER

## 2019-07-23 RX ORDER — ROCURONIUM BROMIDE 10 MG/ML
INJECTION, SOLUTION INTRAVENOUS PRN
Status: DISCONTINUED | OUTPATIENT
Start: 2019-07-23 | End: 2019-07-23 | Stop reason: SDUPTHER

## 2019-07-23 RX ORDER — ACETAMINOPHEN 500 MG
1000 TABLET ORAL EVERY 8 HOURS
Status: COMPLETED | OUTPATIENT
Start: 2019-07-24 | End: 2019-07-25

## 2019-07-23 RX ORDER — HYDRALAZINE HYDROCHLORIDE 20 MG/ML
5 INJECTION INTRAMUSCULAR; INTRAVENOUS EVERY 10 MIN PRN
Status: DISCONTINUED | OUTPATIENT
Start: 2019-07-23 | End: 2019-07-23 | Stop reason: HOSPADM

## 2019-07-23 RX ORDER — AMIODARONE HYDROCHLORIDE 200 MG/1
400 TABLET ORAL 2 TIMES DAILY
Status: DISCONTINUED | OUTPATIENT
Start: 2019-07-23 | End: 2019-07-24

## 2019-07-23 RX ORDER — KETOROLAC TROMETHAMINE 30 MG/ML
INJECTION, SOLUTION INTRAMUSCULAR; INTRAVENOUS
Status: COMPLETED
Start: 2019-07-23 | End: 2019-07-23

## 2019-07-23 RX ORDER — ACETAMINOPHEN 325 MG/1
650 TABLET ORAL EVERY 4 HOURS PRN
Status: DISCONTINUED | OUTPATIENT
Start: 2019-07-23 | End: 2019-07-25 | Stop reason: HOSPADM

## 2019-07-23 RX ORDER — FENTANYL CITRATE 50 UG/ML
INJECTION, SOLUTION INTRAMUSCULAR; INTRAVENOUS PRN
Status: DISCONTINUED | OUTPATIENT
Start: 2019-07-23 | End: 2019-07-23 | Stop reason: SDUPTHER

## 2019-07-23 RX ORDER — LABETALOL HYDROCHLORIDE 5 MG/ML
5 INJECTION, SOLUTION INTRAVENOUS EVERY 10 MIN PRN
Status: DISCONTINUED | OUTPATIENT
Start: 2019-07-23 | End: 2019-07-23 | Stop reason: HOSPADM

## 2019-07-23 RX ORDER — OXYCODONE HYDROCHLORIDE AND ACETAMINOPHEN 5; 325 MG/1; MG/1
2 TABLET ORAL PRN
Status: DISCONTINUED | OUTPATIENT
Start: 2019-07-23 | End: 2019-07-23 | Stop reason: HOSPADM

## 2019-07-23 RX ORDER — OXYCODONE HYDROCHLORIDE 5 MG/1
5 TABLET ORAL EVERY 4 HOURS PRN
Status: DISCONTINUED | OUTPATIENT
Start: 2019-07-23 | End: 2019-07-25 | Stop reason: HOSPADM

## 2019-07-23 RX ORDER — ONDANSETRON 2 MG/ML
4 INJECTION INTRAMUSCULAR; INTRAVENOUS EVERY 6 HOURS PRN
Status: DISCONTINUED | OUTPATIENT
Start: 2019-07-23 | End: 2019-07-25 | Stop reason: HOSPADM

## 2019-07-23 RX ADMIN — MIDAZOLAM HYDROCHLORIDE 2 MG: 2 INJECTION, SOLUTION INTRAMUSCULAR; INTRAVENOUS at 09:11

## 2019-07-23 RX ADMIN — MORPHINE SULFATE 4 MG: 4 INJECTION INTRAVENOUS at 16:29

## 2019-07-23 RX ADMIN — MORPHINE SULFATE 4 MG: 4 INJECTION INTRAVENOUS at 11:36

## 2019-07-23 RX ADMIN — FENTANYL CITRATE 50 MCG: 50 INJECTION INTRAMUSCULAR; INTRAVENOUS at 10:06

## 2019-07-23 RX ADMIN — SODIUM CHLORIDE, POTASSIUM CHLORIDE, SODIUM LACTATE AND CALCIUM CHLORIDE: 600; 310; 30; 20 INJECTION, SOLUTION INTRAVENOUS at 07:49

## 2019-07-23 RX ADMIN — MUPIROCIN: 20 OINTMENT TOPICAL at 20:20

## 2019-07-23 RX ADMIN — SUGAMMADEX 200 MG: 100 INJECTION, SOLUTION INTRAVENOUS at 10:52

## 2019-07-23 RX ADMIN — ACETAMINOPHEN 1000 MG: 10 INJECTION, SOLUTION INTRAVENOUS at 20:20

## 2019-07-23 RX ADMIN — FENTANYL CITRATE 100 MCG: 50 INJECTION INTRAMUSCULAR; INTRAVENOUS at 09:11

## 2019-07-23 RX ADMIN — ROCURONIUM BROMIDE 50 MG: 10 SOLUTION INTRAVENOUS at 09:11

## 2019-07-23 RX ADMIN — ALBUTEROL SULFATE 2.5 MG: 2.5 SOLUTION RESPIRATORY (INHALATION) at 12:00

## 2019-07-23 RX ADMIN — SODIUM CHLORIDE, POTASSIUM CHLORIDE, SODIUM LACTATE AND CALCIUM CHLORIDE: 600; 310; 30; 20 INJECTION, SOLUTION INTRAVENOUS at 09:11

## 2019-07-23 RX ADMIN — Medication 2 PUFF: at 19:48

## 2019-07-23 RX ADMIN — POTASSIUM CHLORIDE, DEXTROSE MONOHYDRATE AND SODIUM CHLORIDE 100 ML/HR: 150; 5; 450 INJECTION, SOLUTION INTRAVENOUS at 11:46

## 2019-07-23 RX ADMIN — ALBUTEROL SULFATE 2.5 MG: 2.5 SOLUTION RESPIRATORY (INHALATION) at 19:48

## 2019-07-23 RX ADMIN — DOCUSATE SODIUM 100 MG: 100 CAPSULE, LIQUID FILLED ORAL at 21:00

## 2019-07-23 RX ADMIN — AMIODARONE HYDROCHLORIDE 400 MG: 200 TABLET ORAL at 11:46

## 2019-07-23 RX ADMIN — LIDOCAINE HYDROCHLORIDE 20 MG: 20 INJECTION, SOLUTION INFILTRATION; PERINEURAL at 09:11

## 2019-07-23 RX ADMIN — Medication 2 G: at 16:29

## 2019-07-23 RX ADMIN — ACETAMINOPHEN 1000 MG: 10 INJECTION, SOLUTION INTRAVENOUS at 12:17

## 2019-07-23 RX ADMIN — PROPOFOL 200 MG: 10 INJECTION, EMULSION INTRAVENOUS at 09:11

## 2019-07-23 RX ADMIN — FENTANYL CITRATE 50 MCG: 50 INJECTION INTRAMUSCULAR; INTRAVENOUS at 11:08

## 2019-07-23 RX ADMIN — ONDANSETRON 4 MG: 2 INJECTION INTRAMUSCULAR; INTRAVENOUS at 09:11

## 2019-07-23 RX ADMIN — FAMOTIDINE 20 MG: 10 INJECTION, SOLUTION INTRAVENOUS at 11:46

## 2019-07-23 RX ADMIN — ALBUTEROL SULFATE 2.5 MG: 2.5 SOLUTION RESPIRATORY (INHALATION) at 16:22

## 2019-07-23 RX ADMIN — Medication 2 G: at 09:11

## 2019-07-23 RX ADMIN — POTASSIUM CHLORIDE, DEXTROSE MONOHYDRATE AND SODIUM CHLORIDE: 150; 5; 450 INJECTION, SOLUTION INTRAVENOUS at 22:16

## 2019-07-23 RX ADMIN — MORPHINE SULFATE 4 MG: 4 INJECTION INTRAVENOUS at 19:31

## 2019-07-23 RX ADMIN — Medication 10 ML: at 14:30

## 2019-07-23 RX ADMIN — KETOROLAC TROMETHAMINE 15 MG: 30 INJECTION, SOLUTION INTRAMUSCULAR at 12:18

## 2019-07-23 RX ADMIN — ROCURONIUM BROMIDE 30 MG: 10 SOLUTION INTRAVENOUS at 09:58

## 2019-07-23 RX ADMIN — FAMOTIDINE 20 MG: 10 INJECTION, SOLUTION INTRAVENOUS at 20:20

## 2019-07-23 RX ADMIN — AMIODARONE HYDROCHLORIDE 400 MG: 200 TABLET ORAL at 20:20

## 2019-07-23 RX ADMIN — Medication 10 ML: at 14:59

## 2019-07-23 RX ADMIN — POLYETHYLENE GLYCOL 3350 17 G: 17 POWDER, FOR SOLUTION ORAL at 19:31

## 2019-07-23 RX ADMIN — PHENYLEPHRINE HYDROCHLORIDE 100 MCG: 10 INJECTION INTRAVENOUS at 10:22

## 2019-07-23 RX ADMIN — Medication 10 ML: at 19:32

## 2019-07-23 RX ADMIN — Medication 10 ML: at 20:20

## 2019-07-23 RX ADMIN — Medication 10 ML: at 12:19

## 2019-07-23 RX ADMIN — MORPHINE SULFATE 4 MG: 4 INJECTION INTRAVENOUS at 14:30

## 2019-07-23 RX ADMIN — Medication 10 ML: at 11:43

## 2019-07-23 RX ADMIN — Medication 10 ML: at 11:46

## 2019-07-23 ASSESSMENT — PULMONARY FUNCTION TESTS
PIF_VALUE: 28
PIF_VALUE: 34
PIF_VALUE: 34
PIF_VALUE: 35
PIF_VALUE: 39
PIF_VALUE: 36
PIF_VALUE: 34
PIF_VALUE: 3
PIF_VALUE: 25
PIF_VALUE: 34
PIF_VALUE: 33
PIF_VALUE: 1
PIF_VALUE: 35
PIF_VALUE: 6
PIF_VALUE: 31
PIF_VALUE: 23
PIF_VALUE: 35
PIF_VALUE: 0
PIF_VALUE: 33
PIF_VALUE: 3
PIF_VALUE: 34
PIF_VALUE: 3
PIF_VALUE: 5
PIF_VALUE: 35
PIF_VALUE: 29
PIF_VALUE: 31
PIF_VALUE: 35
PIF_VALUE: 26
PIF_VALUE: 24
PIF_VALUE: 0
PIF_VALUE: 34
PIF_VALUE: 35
PIF_VALUE: 34
PIF_VALUE: 0
PIF_VALUE: 34
PIF_VALUE: 34
PIF_VALUE: 28
PIF_VALUE: 34
PIF_VALUE: 32
PIF_VALUE: 34
PIF_VALUE: 1
PIF_VALUE: 0
PIF_VALUE: 24
PIF_VALUE: 35
PIF_VALUE: 0
PIF_VALUE: 11
PIF_VALUE: 33
PIF_VALUE: 0
PIF_VALUE: 37
PIF_VALUE: 35
PIF_VALUE: 49
PIF_VALUE: 3
PIF_VALUE: 37
PIF_VALUE: 40
PIF_VALUE: 34
PIF_VALUE: 34
PIF_VALUE: 29
PIF_VALUE: 0
PIF_VALUE: 36
PIF_VALUE: 35
PIF_VALUE: 36
PIF_VALUE: 33
PIF_VALUE: 25
PIF_VALUE: 3
PIF_VALUE: 36
PIF_VALUE: 33
PIF_VALUE: 3
PIF_VALUE: 35
PIF_VALUE: 32
PIF_VALUE: 36
PIF_VALUE: 38
PIF_VALUE: 38
PIF_VALUE: 3
PIF_VALUE: 34
PIF_VALUE: 39
PIF_VALUE: 24
PIF_VALUE: 37
PIF_VALUE: 35
PIF_VALUE: 36
PIF_VALUE: 33
PIF_VALUE: 23
PIF_VALUE: 36
PIF_VALUE: 38
PIF_VALUE: 4
PIF_VALUE: 33
PIF_VALUE: 35
PIF_VALUE: 5
PIF_VALUE: 7
PIF_VALUE: 37
PIF_VALUE: 34
PIF_VALUE: 34
PIF_VALUE: 33
PIF_VALUE: 36
PIF_VALUE: 33
PIF_VALUE: 23
PIF_VALUE: 35
PIF_VALUE: 35
PIF_VALUE: 0
PIF_VALUE: 33
PIF_VALUE: 34
PIF_VALUE: 37
PIF_VALUE: 37
PIF_VALUE: 34
PIF_VALUE: 36
PIF_VALUE: 23
PIF_VALUE: 38
PIF_VALUE: 34
PIF_VALUE: 37
PIF_VALUE: 37
PIF_VALUE: 36
PIF_VALUE: 38
PIF_VALUE: 34
PIF_VALUE: 33

## 2019-07-23 ASSESSMENT — PAIN DESCRIPTION - DESCRIPTORS
DESCRIPTORS: JABBING;SHARP
DESCRIPTORS: JABBING;SHARP

## 2019-07-23 ASSESSMENT — PAIN SCALES - WONG BAKER
WONGBAKER_NUMERICALRESPONSE: 0
WONGBAKER_NUMERICALRESPONSE: 0

## 2019-07-23 ASSESSMENT — PAIN DESCRIPTION - PROGRESSION
CLINICAL_PROGRESSION: GRADUALLY IMPROVING
CLINICAL_PROGRESSION: GRADUALLY WORSENING
CLINICAL_PROGRESSION: GRADUALLY IMPROVING
CLINICAL_PROGRESSION: GRADUALLY IMPROVING
CLINICAL_PROGRESSION: GRADUALLY WORSENING
CLINICAL_PROGRESSION: GRADUALLY WORSENING
CLINICAL_PROGRESSION: GRADUALLY IMPROVING
CLINICAL_PROGRESSION: GRADUALLY IMPROVING

## 2019-07-23 ASSESSMENT — PAIN DESCRIPTION - ONSET
ONSET: PROGRESSIVE
ONSET: PROGRESSIVE

## 2019-07-23 ASSESSMENT — PAIN DESCRIPTION - FREQUENCY
FREQUENCY: CONTINUOUS
FREQUENCY: CONTINUOUS

## 2019-07-23 ASSESSMENT — PAIN SCALES - GENERAL
PAINLEVEL_OUTOF10: 6
PAINLEVEL_OUTOF10: 6
PAINLEVEL_OUTOF10: 2
PAINLEVEL_OUTOF10: 10
PAINLEVEL_OUTOF10: 4
PAINLEVEL_OUTOF10: 0
PAINLEVEL_OUTOF10: 10
PAINLEVEL_OUTOF10: 8

## 2019-07-23 ASSESSMENT — PAIN DESCRIPTION - ORIENTATION
ORIENTATION: RIGHT
ORIENTATION: RIGHT

## 2019-07-23 ASSESSMENT — PAIN - FUNCTIONAL ASSESSMENT
PAIN_FUNCTIONAL_ASSESSMENT: PREVENTS OR INTERFERES SOME ACTIVE ACTIVITIES AND ADLS
PAIN_FUNCTIONAL_ASSESSMENT: 0-10

## 2019-07-23 ASSESSMENT — PAIN DESCRIPTION - PAIN TYPE
TYPE: SURGICAL PAIN
TYPE: SURGICAL PAIN

## 2019-07-23 ASSESSMENT — PAIN DESCRIPTION - LOCATION
LOCATION: CHEST
LOCATION: CHEST

## 2019-07-23 NOTE — H&P
Patient was seen examined this morning image was reviewed history and physical was reviewed no new event or complaining since he last will proceed previously mentioned plan    Linh Aguilera MD [facility]

## 2019-07-23 NOTE — PROGRESS NOTES
4 Eyes Skin Assessment     The patient is being assess for   Admission    I agree that 2 RN's have performed a thorough Head to Toe Skin Assessment on the patient. ALL assessment sites listed below have been assessed. Areas assessed by both nurses:   [x]   Head, Face, and Ears   [x]   Shoulders, Back, and Chest, Abdomen  [x]   Arms, Elbows, and Hands   [x]   Coccyx, Sacrum, and Ischium  [x]   Legs, Feet, and Heels        Scabs and redness observed to bilateral lower legs. Dry skin to bilateral feet    **SHARE this note so that the co-signing nurse is able to place an eSignature**    Co-signer eSignature: Electronically signed by Dariel Hawk RN on 7/23/19 at 8:03 AM    Does the Patient have Skin Breakdown?   No          Sriram Prevention initiated:  No   Wound Care Orders initiated:  No      WOC nurse consulted for Pressure Injury (Stage 3,4, Unstageable, DTI, NWPT, Complex wounds)and New or Established Ostomies:  No      Primary Nurse eSignature: Electronically signed by Whitney Boothe RN on 7/23/19 at 8:02 AM

## 2019-07-24 ENCOUNTER — APPOINTMENT (OUTPATIENT)
Dept: GENERAL RADIOLOGY | Age: 69
DRG: 165 | End: 2019-07-24
Attending: THORACIC SURGERY (CARDIOTHORACIC VASCULAR SURGERY)
Payer: MEDICARE

## 2019-07-24 LAB
A/G RATIO: 0.9 (ref 1.1–2.2)
ALBUMIN SERPL-MCNC: 3 G/DL (ref 3.4–5)
ALP BLD-CCNC: 84 U/L (ref 40–129)
ALT SERPL-CCNC: 35 U/L (ref 10–40)
ANION GAP SERPL CALCULATED.3IONS-SCNC: 8 MMOL/L (ref 3–16)
AST SERPL-CCNC: 31 U/L (ref 15–37)
BILIRUB SERPL-MCNC: <0.2 MG/DL (ref 0–1)
BUN BLDV-MCNC: 14 MG/DL (ref 7–20)
CALCIUM SERPL-MCNC: 8 MG/DL (ref 8.3–10.6)
CHLORIDE BLD-SCNC: 106 MMOL/L (ref 99–110)
CO2: 23 MMOL/L (ref 21–32)
CREAT SERPL-MCNC: 0.8 MG/DL (ref 0.6–1.2)
GFR AFRICAN AMERICAN: >60
GFR NON-AFRICAN AMERICAN: >60
GLOBULIN: 3.3 G/DL
GLUCOSE BLD-MCNC: 136 MG/DL (ref 70–99)
POTASSIUM REFLEX MAGNESIUM: 4.9 MMOL/L (ref 3.5–5.1)
REASON FOR REJECTION: NORMAL
REJECTED TEST: NORMAL
SODIUM BLD-SCNC: 137 MMOL/L (ref 136–145)
TOTAL PROTEIN: 6.3 G/DL (ref 6.4–8.2)

## 2019-07-24 PROCEDURE — 80053 COMPREHEN METABOLIC PANEL: CPT

## 2019-07-24 PROCEDURE — 2700000000 HC OXYGEN THERAPY PER DAY

## 2019-07-24 PROCEDURE — 71045 X-RAY EXAM CHEST 1 VIEW: CPT

## 2019-07-24 PROCEDURE — 94640 AIRWAY INHALATION TREATMENT: CPT

## 2019-07-24 PROCEDURE — 6370000000 HC RX 637 (ALT 250 FOR IP): Performed by: THORACIC SURGERY (CARDIOTHORACIC VASCULAR SURGERY)

## 2019-07-24 PROCEDURE — 2000000000 HC ICU R&B

## 2019-07-24 PROCEDURE — 99999 PR OFFICE/OUTPT VISIT,PROCEDURE ONLY: CPT | Performed by: THORACIC SURGERY (CARDIOTHORACIC VASCULAR SURGERY)

## 2019-07-24 PROCEDURE — 6370000000 HC RX 637 (ALT 250 FOR IP): Performed by: NURSE PRACTITIONER

## 2019-07-24 PROCEDURE — 6360000002 HC RX W HCPCS: Performed by: THORACIC SURGERY (CARDIOTHORACIC VASCULAR SURGERY)

## 2019-07-24 PROCEDURE — 6360000002 HC RX W HCPCS: Performed by: NURSE PRACTITIONER

## 2019-07-24 PROCEDURE — 36415 COLL VENOUS BLD VENIPUNCTURE: CPT

## 2019-07-24 PROCEDURE — 94669 MECHANICAL CHEST WALL OSCILL: CPT

## 2019-07-24 PROCEDURE — 2500000003 HC RX 250 WO HCPCS: Performed by: THORACIC SURGERY (CARDIOTHORACIC VASCULAR SURGERY)

## 2019-07-24 PROCEDURE — 2580000003 HC RX 258: Performed by: THORACIC SURGERY (CARDIOTHORACIC VASCULAR SURGERY)

## 2019-07-24 PROCEDURE — 94761 N-INVAS EAR/PLS OXIMETRY MLT: CPT

## 2019-07-24 RX ORDER — PROCHLORPERAZINE EDISYLATE 5 MG/ML
10 INJECTION INTRAMUSCULAR; INTRAVENOUS EVERY 6 HOURS PRN
Status: DISCONTINUED | OUTPATIENT
Start: 2019-07-24 | End: 2019-07-25 | Stop reason: HOSPADM

## 2019-07-24 RX ORDER — AMIODARONE HYDROCHLORIDE 200 MG/1
200 TABLET ORAL DAILY
Status: DISCONTINUED | OUTPATIENT
Start: 2019-07-24 | End: 2019-07-25 | Stop reason: HOSPADM

## 2019-07-24 RX ADMIN — FAMOTIDINE 20 MG: 10 INJECTION, SOLUTION INTRAVENOUS at 08:05

## 2019-07-24 RX ADMIN — DOCUSATE SODIUM 100 MG: 100 CAPSULE, LIQUID FILLED ORAL at 22:33

## 2019-07-24 RX ADMIN — ENOXAPARIN SODIUM 40 MG: 40 INJECTION SUBCUTANEOUS at 08:05

## 2019-07-24 RX ADMIN — Medication 2 PUFF: at 07:18

## 2019-07-24 RX ADMIN — MORPHINE SULFATE 4 MG: 4 INJECTION INTRAVENOUS at 05:38

## 2019-07-24 RX ADMIN — Medication 10 ML: at 08:06

## 2019-07-24 RX ADMIN — MUPIROCIN: 20 OINTMENT TOPICAL at 08:05

## 2019-07-24 RX ADMIN — OXYCODONE HYDROCHLORIDE 10 MG: 5 TABLET ORAL at 08:05

## 2019-07-24 RX ADMIN — KETOROLAC TROMETHAMINE 15 MG: 30 INJECTION, SOLUTION INTRAMUSCULAR at 08:04

## 2019-07-24 RX ADMIN — ALBUTEROL SULFATE 2.5 MG: 2.5 SOLUTION RESPIRATORY (INHALATION) at 20:38

## 2019-07-24 RX ADMIN — FAMOTIDINE 20 MG: 10 INJECTION, SOLUTION INTRAVENOUS at 21:00

## 2019-07-24 RX ADMIN — ONDANSETRON 4 MG: 2 INJECTION INTRAMUSCULAR; INTRAVENOUS at 20:03

## 2019-07-24 RX ADMIN — AMIODARONE HYDROCHLORIDE 200 MG: 200 TABLET ORAL at 08:05

## 2019-07-24 RX ADMIN — ACETAMINOPHEN 1000 MG: 10 INJECTION, SOLUTION INTRAVENOUS at 04:12

## 2019-07-24 RX ADMIN — ALBUTEROL SULFATE 2.5 MG: 2.5 SOLUTION RESPIRATORY (INHALATION) at 16:22

## 2019-07-24 RX ADMIN — DOCUSATE SODIUM 100 MG: 100 CAPSULE, LIQUID FILLED ORAL at 08:05

## 2019-07-24 RX ADMIN — ALBUTEROL SULFATE 2.5 MG: 2.5 SOLUTION RESPIRATORY (INHALATION) at 07:18

## 2019-07-24 RX ADMIN — KETOROLAC TROMETHAMINE 15 MG: 30 INJECTION, SOLUTION INTRAMUSCULAR at 01:27

## 2019-07-24 RX ADMIN — Medication 10 ML: at 21:00

## 2019-07-24 RX ADMIN — OXYCODONE HYDROCHLORIDE 5 MG: 5 TABLET ORAL at 18:54

## 2019-07-24 RX ADMIN — OXYCODONE HYDROCHLORIDE 10 MG: 5 TABLET ORAL at 12:22

## 2019-07-24 RX ADMIN — Medication 2 G: at 01:22

## 2019-07-24 RX ADMIN — ALBUTEROL SULFATE 2.5 MG: 2.5 SOLUTION RESPIRATORY (INHALATION) at 11:30

## 2019-07-24 RX ADMIN — ACETAMINOPHEN 1000 MG: 500 TABLET ORAL at 22:33

## 2019-07-24 RX ADMIN — MUPIROCIN: 20 OINTMENT TOPICAL at 21:01

## 2019-07-24 RX ADMIN — Medication 2 PUFF: at 20:38

## 2019-07-24 RX ADMIN — ACETAMINOPHEN 1000 MG: 500 TABLET ORAL at 12:21

## 2019-07-24 ASSESSMENT — PAIN SCALES - GENERAL
PAINLEVEL_OUTOF10: 7
PAINLEVEL_OUTOF10: 4
PAINLEVEL_OUTOF10: 4
PAINLEVEL_OUTOF10: 7
PAINLEVEL_OUTOF10: 4
PAINLEVEL_OUTOF10: 7
PAINLEVEL_OUTOF10: 7
PAINLEVEL_OUTOF10: 4

## 2019-07-24 ASSESSMENT — PAIN DESCRIPTION - PROGRESSION
CLINICAL_PROGRESSION: NOT CHANGED

## 2019-07-24 ASSESSMENT — PAIN DESCRIPTION - DESCRIPTORS
DESCRIPTORS: ACHING
DESCRIPTORS: ACHING
DESCRIPTORS: SHARP;ACHING

## 2019-07-24 ASSESSMENT — PAIN DESCRIPTION - FREQUENCY
FREQUENCY: CONTINUOUS

## 2019-07-24 ASSESSMENT — PAIN DESCRIPTION - PAIN TYPE
TYPE: SURGICAL PAIN

## 2019-07-24 ASSESSMENT — PAIN DESCRIPTION - ORIENTATION
ORIENTATION: RIGHT

## 2019-07-24 ASSESSMENT — PAIN DESCRIPTION - LOCATION
LOCATION: CHEST

## 2019-07-24 ASSESSMENT — PAIN DESCRIPTION - ONSET
ONSET: ON-GOING
ONSET: ON-GOING
ONSET: GRADUAL

## 2019-07-24 ASSESSMENT — PAIN - FUNCTIONAL ASSESSMENT: PAIN_FUNCTIONAL_ASSESSMENT: PREVENTS OR INTERFERES SOME ACTIVE ACTIVITIES AND ADLS

## 2019-07-24 NOTE — PROGRESS NOTES
Verbal order to remove chest tube per Dr. Brandon Daniel. No air leak. No crepitus. Pt instructed on procedure. Site cleansed and prepped per protocol. Chest tubes X1 removed without difficulty. Dry sterile dressing applied. Bilateral breath sounds audible. O2 Sats 96% on RA. Patient tolerated well.

## 2019-07-24 NOTE — PROGRESS NOTES
4 Eyes Skin Assessment     The patient is being assess for  Post-Op Surgical transfer from the OR to room C 233    I agree that 2 RN's have performed a thorough Head to Toe Skin Assessment on the patient. ALL assessment sites listed below have been assessed. Areas assessed by both nurses: Norval Low  [x]   Head, Face, and Ears   [x]   Shoulders, Back, and Chest  [x]   Arms, Elbows, and Hands   [x]   Coccyx, Sacrum, and Ischum  [x]   Legs, Feet, and Heels        Does the Patient have Skin Breakdown? No   Very dry calloused feet, heels dry, brown and cracked. No open wounds. Right ankle dry thick skin. Again no open areas. Coccyx and heel intact. NO break down noted. Left fore arm dark brown, dry large mole.         Sriram Prevention initiated:  Yes   Wound Care Orders initiated:  No      [facility] nurse consulted for Pressure Injury (Stage 3,4, Unstageable, DTI, NWPT, and Complex wounds):  No      Primary Nurse eSignature: Electronically signed by Abad Grier RN on 7/24/19 at 10:07 AM    **SHARE this note so that the co-signing nurse is able to place an eSignature**    Co-signer eSignature: {Esignature:[id number]}

## 2019-07-24 NOTE — ANESTHESIA POSTPROCEDURE EVALUATION
Department of Anesthesiology  Postprocedure Note    Patient: Meron Garcia  MRN: [id number]  YOB: 1950  Date of evaluation: 7/23/2019  Time:  8:22 PM     Procedure Summary     Date:  07/23/19 Room / Location:  [facility] CVOR    Anesthesia Start:  0911 Anesthesia Stop:  1110    Procedure:  RIGHT VIDEO ASSISTED THORACOSCOPIC SURGERY, POSSIBLE THORACOTOMY WITH RIGHT MIDDLE LOBE LOBECTOMY AND MEDIASTINAL LYMPHADENECTOMY, 7 LEVEL INTERCOSTAL NERVE BLOCK  CPT CODE - 26276 (Right Chest) Diagnosis:       Lung nodule      (RIGHT UPPER LOBE LUNG NODULE)    Surgeon:  Suzy Chirinos MD Responsible Provider:  Mac Correia MD    Anesthesia Type:  general ASA Status:  3          Anesthesia Type: general    Smiley Phase I: Smiley Score: 10    Smiley Phase II:      Last vitals: Reviewed and per EMR flowsheets.        Anesthesia Post Evaluation    Patient location during evaluation: ICU  Level of consciousness: awake  Airway patency: patent  Nausea & Vomiting: no nausea  Complications: no  Cardiovascular status: blood pressure returned to baseline  Respiratory status: acceptable  Hydration status: euvolemic

## 2019-07-24 NOTE — PROGRESS NOTES
CVTS Thoracic Progress Note:              CC:  Post op follow up    7/24/19 RIGHT VIDEO ASSISTED THORACOSCOPIC SURGERY, POSSIBLE THORACOTOMY WITH RIGHT MIDDLE LOBE LOBECTOMY AND MEDIASTINAL LYMPHADENECTOMY    Subj:  Doing well, up in chair    Obj:    Blood pressure 110/61, pulse 70, temperature 98 °F (36.7 °C), temperature source Oral, resp. rate 17, height 5' 4\" (1.626 m), weight 256 lb 13.4 oz (116.5 kg), SpO2 96 %. Lungs scattered rhonchi RA   Abdomen soft, nontender   Right Incision with occlusive dressing   Chest tube with 5-0-55 serosanguinous drainage in last 24 hours/ no airleak    Diagnostics:                                                                Recent Labs     07/24/19  0431      K 4.9      CO2 23   BUN 14   CREATININE 0.8   GLUCOSE 136*     Assess/Plan:   S/p R VATS   Pathology pending   Chest tube suction for now   DC IVF   DC Gonzales   ADAT   Ambulation   Prophylaxis amio for 15 days  Analgesia   Tylenol, and PRN Toradol, oxycodone and IV morphine  Prophylaxis   Lovenox, bowel regimen, H2    ________________________________________________________________  SCIP Measures:  · Gonzales catheter for:  ? IV Diuresis  ? Accurate I/O  ? D/C today  · Antibiotics:  ? Have been stopped  ? Prophylaxis (POD #1 only) ? Continued for:   ________________________________________________________________    Oswald Cervantes, PhD, CNP  7/24/2019  12:58 PM  Note reviewed, events of last 24 hours reviewed along with vital signs and I/Os and patient examined. Assessment and plans discussed and are as outlined above.      Papa Villeda MD, FACS, [facility] [place], FACHAMZAH, [place]

## 2019-07-25 VITALS
HEIGHT: 64 IN | OXYGEN SATURATION: 96 % | WEIGHT: 257.4 LBS | HEART RATE: 73 BPM | SYSTOLIC BLOOD PRESSURE: 121 MMHG | BODY MASS INDEX: 43.94 KG/M2 | RESPIRATION RATE: 16 BRPM | DIASTOLIC BLOOD PRESSURE: 52 MMHG | TEMPERATURE: 98.3 F

## 2019-07-25 PROBLEM — R91.8 MASS OF RIGHT LUNG: Status: RESOLVED | Noted: 2019-07-23 | Resolved: 2019-07-25

## 2019-07-25 PROCEDURE — 07B74ZX EXCISION OF THORAX LYMPHATIC, PERCUTANEOUS ENDOSCOPIC APPROACH, DIAGNOSTIC: ICD-10-PCS | Performed by: THORACIC SURGERY (CARDIOTHORACIC VASCULAR SURGERY)

## 2019-07-25 PROCEDURE — 3E0T3BZ INTRODUCTION OF ANESTHETIC AGENT INTO PERIPHERAL NERVES AND PLEXI, PERCUTANEOUS APPROACH: ICD-10-PCS | Performed by: THORACIC SURGERY (CARDIOTHORACIC VASCULAR SURGERY)

## 2019-07-25 PROCEDURE — 94669 MECHANICAL CHEST WALL OSCILL: CPT

## 2019-07-25 PROCEDURE — 6360000002 HC RX W HCPCS: Performed by: NURSE PRACTITIONER

## 2019-07-25 PROCEDURE — 97535 SELF CARE MNGMENT TRAINING: CPT

## 2019-07-25 PROCEDURE — 97165 OT EVAL LOW COMPLEX 30 MIN: CPT

## 2019-07-25 PROCEDURE — 97161 PT EVAL LOW COMPLEX 20 MIN: CPT

## 2019-07-25 PROCEDURE — 6370000000 HC RX 637 (ALT 250 FOR IP): Performed by: THORACIC SURGERY (CARDIOTHORACIC VASCULAR SURGERY)

## 2019-07-25 PROCEDURE — 0BTD4ZZ RESECTION OF RIGHT MIDDLE LUNG LOBE, PERCUTANEOUS ENDOSCOPIC APPROACH: ICD-10-PCS | Performed by: THORACIC SURGERY (CARDIOTHORACIC VASCULAR SURGERY)

## 2019-07-25 PROCEDURE — 6370000000 HC RX 637 (ALT 250 FOR IP): Performed by: NURSE PRACTITIONER

## 2019-07-25 PROCEDURE — 94150 VITAL CAPACITY TEST: CPT

## 2019-07-25 PROCEDURE — 2580000003 HC RX 258: Performed by: THORACIC SURGERY (CARDIOTHORACIC VASCULAR SURGERY)

## 2019-07-25 PROCEDURE — 97116 GAIT TRAINING THERAPY: CPT

## 2019-07-25 PROCEDURE — 94640 AIRWAY INHALATION TREATMENT: CPT

## 2019-07-25 PROCEDURE — 6360000002 HC RX W HCPCS: Performed by: THORACIC SURGERY (CARDIOTHORACIC VASCULAR SURGERY)

## 2019-07-25 PROCEDURE — 0BJ08ZZ INSPECTION OF TRACHEOBRONCHIAL TREE, VIA NATURAL OR ARTIFICIAL OPENING ENDOSCOPIC: ICD-10-PCS | Performed by: THORACIC SURGERY (CARDIOTHORACIC VASCULAR SURGERY)

## 2019-07-25 RX ORDER — PSEUDOEPHEDRINE HCL 30 MG
100 TABLET ORAL 2 TIMES DAILY
COMMUNITY
Start: 2019-07-25 | End: 2019-08-12

## 2019-07-25 RX ORDER — POLYETHYLENE GLYCOL 3350 17 G/17G
17 POWDER, FOR SOLUTION ORAL DAILY
Status: DISCONTINUED | OUTPATIENT
Start: 2019-07-25 | End: 2019-07-25 | Stop reason: HOSPADM

## 2019-07-25 RX ORDER — ALBUTEROL SULFATE 2.5 MG/3ML
2.5 SOLUTION RESPIRATORY (INHALATION) EVERY 4 HOURS PRN
Status: DISCONTINUED | OUTPATIENT
Start: 2019-07-25 | End: 2019-07-25 | Stop reason: HOSPADM

## 2019-07-25 RX ORDER — FUROSEMIDE 20 MG/1
20 TABLET ORAL DAILY
Qty: 7 TABLET | Refills: 0 | Status: SHIPPED | OUTPATIENT
Start: 2019-07-25 | End: 2019-08-12 | Stop reason: ALTCHOICE

## 2019-07-25 RX ORDER — FAMOTIDINE 20 MG/1
20 TABLET, FILM COATED ORAL 2 TIMES DAILY
Qty: 60 TABLET | Refills: 0 | Status: SHIPPED | OUTPATIENT
Start: 2019-07-25 | End: 2020-04-22

## 2019-07-25 RX ORDER — FAMOTIDINE 20 MG/1
20 TABLET, FILM COATED ORAL 2 TIMES DAILY
Status: DISCONTINUED | OUTPATIENT
Start: 2019-07-25 | End: 2019-07-25 | Stop reason: HOSPADM

## 2019-07-25 RX ORDER — OXYCODONE HYDROCHLORIDE 5 MG/1
5 TABLET ORAL EVERY 6 HOURS PRN
Qty: 28 TABLET | Refills: 0 | Status: SHIPPED | OUTPATIENT
Start: 2019-07-25 | End: 2019-08-01

## 2019-07-25 RX ORDER — POTASSIUM CHLORIDE 750 MG/1
10 TABLET, EXTENDED RELEASE ORAL EVERY OTHER DAY
Qty: 4 TABLET | Refills: 0 | Status: SHIPPED | OUTPATIENT
Start: 2019-07-25 | End: 2019-08-12 | Stop reason: ALTCHOICE

## 2019-07-25 RX ORDER — FUROSEMIDE 10 MG/ML
20 INJECTION INTRAMUSCULAR; INTRAVENOUS ONCE
Status: COMPLETED | OUTPATIENT
Start: 2019-07-25 | End: 2019-07-25

## 2019-07-25 RX ORDER — AMIODARONE HYDROCHLORIDE 200 MG/1
200 TABLET ORAL DAILY
Qty: 15 TABLET | Refills: 0 | Status: SHIPPED | OUTPATIENT
Start: 2019-07-26 | End: 2019-08-12 | Stop reason: ALTCHOICE

## 2019-07-25 RX ADMIN — FUROSEMIDE 20 MG: 10 INJECTION, SOLUTION INTRAMUSCULAR; INTRAVENOUS at 08:15

## 2019-07-25 RX ADMIN — POLYETHYLENE GLYCOL 3350 17 G: 17 POWDER, FOR SOLUTION ORAL at 08:15

## 2019-07-25 RX ADMIN — ENOXAPARIN SODIUM 40 MG: 40 INJECTION SUBCUTANEOUS at 08:16

## 2019-07-25 RX ADMIN — Medication 2 PUFF: at 08:47

## 2019-07-25 RX ADMIN — FAMOTIDINE 20 MG: 20 TABLET ORAL at 08:15

## 2019-07-25 RX ADMIN — DOCUSATE SODIUM 100 MG: 100 CAPSULE, LIQUID FILLED ORAL at 08:15

## 2019-07-25 RX ADMIN — OXYCODONE HYDROCHLORIDE 5 MG: 5 TABLET ORAL at 08:23

## 2019-07-25 RX ADMIN — ACETAMINOPHEN 1000 MG: 500 TABLET ORAL at 05:27

## 2019-07-25 RX ADMIN — Medication 10 ML: at 08:16

## 2019-07-25 RX ADMIN — AMIODARONE HYDROCHLORIDE 200 MG: 200 TABLET ORAL at 08:15

## 2019-07-25 ASSESSMENT — PAIN SCALES - GENERAL
PAINLEVEL_OUTOF10: 5
PAINLEVEL_OUTOF10: 5

## 2019-07-25 NOTE — DISCHARGE SUMMARY
Cardiac, Vascular & Thoracic Surgery  Discharge Summary    Patient:  Diane Leopoldo Dubois Ramsden 1950 [id number]   Admission Date:  7/23/2019  6:44 AM  Discharge Date:  7/25/19     Principle Diagnosis:  lung nodule     Secondary Diagnosis:  Active Problems:    * No active hospital problems. *  Resolved Problems:    Mass of right lung     Procedure: 7/24/19 RIGHT VIDEO ASSISTED THORACOSCOPIC SURGERY, POSSIBLE THORACOTOMY WITH RIGHT MIDDLE LOBE LOBECTOMY AND MEDIASTINAL LYMPHADENECTOMY     History:Diane Leopoldo Dubois Ramsden is a 80 y.o. female who has a history of smoking & quit 4 years ago.  Recent CT scan demonstrated lung nodule which has progressively increased in size since 2017.  She remains asymptomatic.  [facility] patient had an uncomplicated postoperative course.  She will be discharged home today.        Discharged Condition: stable     Disposition:  home    Discharge Medications:   Jennifer Mojica   Home Medication Instructions CCC:[id number]    Printed on:07/25/19 0948   Medication Information                      albuterol sulfate HFA (VENTOLIN HFA) 108 (90 Base) MCG/ACT inhaler  Inhale 2 puffs into the lungs every 6 hours as needed for Wheezing or Shortness of Breath             amiodarone (CORDARONE) 200 MG tablet  Take 1 tablet by mouth daily for 15 days             docusate sodium (COLACE, DULCOLAX) 100 MG CAPS  Take 100 mg by mouth 2 times daily             famotidine (PEPCID) 20 MG tablet  Take 1 tablet by mouth 2 times daily             furosemide (LASIX) 20 MG tablet  Take 1 tablet by mouth daily for 7 days             oxyCODONE (ROXICODONE) 5 MG immediate release tablet  Take 1 tablet by mouth every 6 hours as needed for Pain for up to 7 days.              potassium chloride (KLOR-CON M) 10 MEQ extended release tablet  Take 1 tablet by mouth every other day for 4 doses             tiotropium (SPIRIVA RESPIMAT) 2.5 MCG/ACT AERS inhaler  Inhale 2 puffs into the lungs daily Patient Instructions: Activity: DO NOT LIFT, PUSH, OR PULL ANYTHING OVER 5 POUNDS FOR 6 WEEK from the day of surgery  Diet:  regular diet  Wound Care:  WASH YOUR INCISIONS DAILY WITH A CLEAN WASHCLOTH AND ANTIBACTERIAL SOAP.  Do not wash your incisions after you have cleansed other parts of your body     Follow up with Cardiothoracic Surgeon, Dori Stark in 2 weeks.     Waleska Anderson PhD, APRN-CNP

## 2019-07-25 NOTE — PROGRESS NOTES
Practitioner: Zarina Ibanez MD 7/24/19  Diagnosis: R VAT THORACOSCOPIC SURGERY,THORACOTOMY WITH RIGHT MIDDLE LOBE LOBECTOMY AND MEDIASTINAL LYMPHADENECTOMY  7/23/19  Subjective  Subjective: Pt. pleasant and agreeable to OT evaluation, states he been up moving around. Plans to go home today. Patient Currently in Pain: Denies  Vital Signs  Patient Currently in Pain: Denies  Social/Functional History  Social/Functional History  Lives With: Spouse(home with pt 24/7)  Type of Home: House  Home Layout: One level  Home Access: Stairs to enter with rails(4 ELIAS)  Entrance Stairs - Rails: Left(ascending)  Bathroom Shower/Tub: Tub/Shower unit  Bathroom Toilet: Standard  Bathroom Equipment: Tub transfer bench, Hand-held shower  Home Equipment: Cane(pt has a walker but unsure if it has wheels or not)  ADL Assistance: Independent  Homemaking Assistance: Independent  Ambulation Assistance: Independent(without AD)  Transfer Assistance: Independent  Active : Yes  Occupation: Part time employment  Type of occupation: Works 1 day/week at [address] [place] [address] Road: [address] [facility], horseback riding (although pt reports she hasn't done much of either in the last 1-2 years)       Objective        Orientation  Overall Orientation Status: Within Functional Limits  Observation/Palpation  Posture: Good  Balance  Sitting Balance: Independent  Standing Balance: Independent    Functional Mobility  Functional - Mobility Device: No device  Activity: To/from bathroom  Assist Level: Independent    Toilet Transfers  Toilet - Technique: Ambulating  Equipment Used: Standard toilet  Toilet Transfer: Independent    ADL  Grooming: Supervision  LE Dressing: Supervision  Toileting: Independent     Tone RUE  RUE Tone: Normotonic  Tone LUE  LUE Tone: Normotonic  Coordination  Movements Are Fluid And Coordinated: Yes     Bed mobility  Comment: up to chair pre/post session. Transfers  Sit to stand: Independent  Stand to sit:  Independent Cognition  Overall Cognitive Status: WFL      LUE AROM (degrees)  LUE AROM : WFL  RUE AROM (degrees)  RUE AROM : WFL  LUE Strength  Gross LUE Strength: WFL  RUE Strength  Gross RUE Strength: WFL      Plan   Plan  Times per week: 1x only      AM-PAC Score        AM-PAC Inpatient Daily Activity Raw Score: 22 (07/25/19 1110)  AM-PAC Inpatient ADL T-Scale Score : 47.1 (07/25/19 1110)  ADL Inpatient CMS 0-100% Score: 25.8 (07/25/19 1110)  ADL Inpatient CMS G-Code Modifier : CJ (07/25/19 1110)    Goals  Short term goals  Time Frame for Short term goals: 1 session  Short term goal 1: Pt will complete toilet trnasfers with supervision-stg met 7/25  Short term goal 2: Pt. will complete LE dressing wtih supervision-STG met 7/25  Patient Goals   Patient goals : \"to go home\"       Therapy Time   Individual Concurrent Group Co-treatment   Time In 0950         Time Out 1018         Minutes 28         Timed Code Treatment Minutes: 25 Minutes       Johnny Galeas, OT

## 2019-07-26 PROBLEM — Z98.890 S/P THORACOTOMY: Status: ACTIVE | Noted: 2019-07-01

## 2019-07-26 PROBLEM — C34.91 ADENOCARCINOMA, LUNG, RIGHT (HCC): Status: ACTIVE | Noted: 2019-07-01

## 2019-07-31 ENCOUNTER — TELEPHONE (OUTPATIENT)
Dept: CARDIOTHORACIC SURGERY | Age: 69
End: 2019-07-31

## 2019-08-12 ENCOUNTER — OFFICE VISIT (OUTPATIENT)
Dept: CARDIOTHORACIC SURGERY | Age: 69
End: 2019-08-12

## 2019-08-12 VITALS
TEMPERATURE: 98.1 F | DIASTOLIC BLOOD PRESSURE: 98 MMHG | BODY MASS INDEX: 42 KG/M2 | HEART RATE: 77 BPM | WEIGHT: 246 LBS | OXYGEN SATURATION: 93 % | HEIGHT: 64 IN | SYSTOLIC BLOOD PRESSURE: 142 MMHG

## 2019-08-12 DIAGNOSIS — R05.9 COUGH: ICD-10-CM

## 2019-08-12 DIAGNOSIS — C34.91 ADENOCARCINOMA, LUNG, RIGHT (HCC): ICD-10-CM

## 2019-08-12 DIAGNOSIS — G89.18 POST-OP PAIN: Primary | ICD-10-CM

## 2019-08-12 PROCEDURE — 99024 POSTOP FOLLOW-UP VISIT: CPT | Performed by: THORACIC SURGERY (CARDIOTHORACIC VASCULAR SURGERY)

## 2019-08-12 RX ORDER — OXYCODONE HYDROCHLORIDE 5 MG/1
5 TABLET ORAL EVERY 6 HOURS PRN
COMMUNITY
End: 2019-08-12 | Stop reason: SDUPTHER

## 2019-08-12 RX ORDER — BUDESONIDE AND FORMOTEROL FUMARATE DIHYDRATE 160; 4.5 UG/1; UG/1
2 AEROSOL RESPIRATORY (INHALATION) 2 TIMES DAILY
COMMUNITY
End: 2020-04-22 | Stop reason: ALTCHOICE

## 2019-08-12 RX ORDER — METHYLPREDNISOLONE 4 MG/1
TABLET ORAL
Qty: 1 KIT | Refills: 0 | Status: SHIPPED | OUTPATIENT
Start: 2019-08-12 | End: 2019-08-18

## 2019-08-12 RX ORDER — OXYCODONE HYDROCHLORIDE 5 MG/1
5 TABLET ORAL EVERY 6 HOURS PRN
Qty: 28 TABLET | Refills: 0 | Status: SHIPPED | OUTPATIENT
Start: 2019-08-12 | End: 2019-08-19

## 2019-08-21 ENCOUNTER — TELEPHONE (OUTPATIENT)
Dept: CARDIOTHORACIC SURGERY | Age: 69
End: 2019-08-21

## 2019-08-21 DIAGNOSIS — C34.91 ADENOCARCINOMA, LUNG, RIGHT (HCC): Primary | ICD-10-CM

## 2019-08-21 DIAGNOSIS — R05.3 COUGH, PERSISTENT: ICD-10-CM

## 2019-08-22 ENCOUNTER — HOSPITAL ENCOUNTER (OUTPATIENT)
Age: 69
Discharge: HOME | End: 2019-08-22
Payer: MEDICARE

## 2019-08-22 ENCOUNTER — HOSPITAL ENCOUNTER (OUTPATIENT)
Dept: GENERAL RADIOLOGY | Age: 69
Discharge: HOME | End: 2019-08-22
Payer: MEDICARE

## 2019-08-22 ENCOUNTER — TELEPHONE (OUTPATIENT)
Dept: CARDIOTHORACIC SURGERY | Age: 69
End: 2019-08-22

## 2019-08-22 DIAGNOSIS — C34.91 ADENOCARCINOMA, LUNG, RIGHT (HCC): ICD-10-CM

## 2019-08-22 DIAGNOSIS — R05.3 COUGH, PERSISTENT: ICD-10-CM

## 2019-08-22 PROCEDURE — 71046 X-RAY EXAM CHEST 2 VIEWS: CPT

## 2019-09-09 ENCOUNTER — OFFICE VISIT (OUTPATIENT)
Dept: CARDIOTHORACIC SURGERY | Age: 69
End: 2019-09-09

## 2019-09-09 VITALS
OXYGEN SATURATION: 98 % | WEIGHT: 251.2 LBS | RESPIRATION RATE: 14 BRPM | BODY MASS INDEX: 44.51 KG/M2 | DIASTOLIC BLOOD PRESSURE: 92 MMHG | TEMPERATURE: 97.8 F | HEIGHT: 63 IN | SYSTOLIC BLOOD PRESSURE: 138 MMHG | HEART RATE: 77 BPM

## 2019-09-09 DIAGNOSIS — C34.91 ADENOCARCINOMA, LUNG, RIGHT (HCC): Primary | ICD-10-CM

## 2019-09-09 DIAGNOSIS — Z98.890 S/P THORACOTOMY: ICD-10-CM

## 2019-09-09 PROCEDURE — 99024 POSTOP FOLLOW-UP VISIT: CPT | Performed by: THORACIC SURGERY (CARDIOTHORACIC VASCULAR SURGERY)

## 2019-09-11 ENCOUNTER — OFFICE VISIT (OUTPATIENT)
Dept: PULMONOLOGY | Age: 69
End: 2019-09-11
Payer: MEDICARE

## 2019-09-11 VITALS
WEIGHT: 251.4 LBS | BODY MASS INDEX: 44.54 KG/M2 | TEMPERATURE: 97.9 F | RESPIRATION RATE: 16 BRPM | DIASTOLIC BLOOD PRESSURE: 78 MMHG | SYSTOLIC BLOOD PRESSURE: 138 MMHG | HEART RATE: 78 BPM | OXYGEN SATURATION: 96 % | HEIGHT: 63 IN

## 2019-09-11 DIAGNOSIS — Z87.891 PERSONAL HISTORY OF TOBACCO USE: ICD-10-CM

## 2019-09-11 DIAGNOSIS — R91.1 PULMONARY NODULE: ICD-10-CM

## 2019-09-11 DIAGNOSIS — J44.9 MODERATE COPD (CHRONIC OBSTRUCTIVE PULMONARY DISEASE) (HCC): ICD-10-CM

## 2019-09-11 DIAGNOSIS — C34.91 ADENOCARCINOMA OF RIGHT LUNG (HCC): Primary | ICD-10-CM

## 2019-09-11 DIAGNOSIS — R06.09 DYSPNEA ON EXERTION: ICD-10-CM

## 2019-09-11 PROCEDURE — 3017F COLORECTAL CA SCREEN DOC REV: CPT | Performed by: INTERNAL MEDICINE

## 2019-09-11 PROCEDURE — 1123F ACP DISCUSS/DSCN MKR DOCD: CPT | Performed by: INTERNAL MEDICINE

## 2019-09-11 PROCEDURE — G8417 CALC BMI ABV UP PARAM F/U: HCPCS | Performed by: INTERNAL MEDICINE

## 2019-09-11 PROCEDURE — G8427 DOCREV CUR MEDS BY ELIG CLIN: HCPCS | Performed by: INTERNAL MEDICINE

## 2019-09-11 PROCEDURE — G8400 PT W/DXA NO RESULTS DOC: HCPCS | Performed by: INTERNAL MEDICINE

## 2019-09-11 PROCEDURE — 99214 OFFICE O/P EST MOD 30 MIN: CPT | Performed by: INTERNAL MEDICINE

## 2019-09-11 PROCEDURE — 1036F TOBACCO NON-USER: CPT | Performed by: INTERNAL MEDICINE

## 2019-09-11 PROCEDURE — 4040F PNEUMOC VAC/ADMIN/RCVD: CPT | Performed by: INTERNAL MEDICINE

## 2019-09-11 PROCEDURE — G8926 SPIRO NO PERF OR DOC: HCPCS | Performed by: INTERNAL MEDICINE

## 2019-09-11 PROCEDURE — 3023F SPIROM DOC REV: CPT | Performed by: INTERNAL MEDICINE

## 2019-09-11 PROCEDURE — 1090F PRES/ABSN URINE INCON ASSESS: CPT | Performed by: INTERNAL MEDICINE

## 2019-09-11 RX ORDER — POTASSIUM CHLORIDE 750 MG/1
10 TABLET, FILM COATED, EXTENDED RELEASE ORAL
COMMUNITY
Start: 2019-07-25

## 2019-09-11 NOTE — PROGRESS NOTES
[facility]                                                                    CHIEF COMPLAINT: Follow-up post surgery         HPI:   Patient underwent right middle lobe lobectomy that showed adenocarcinoma 7/23/2019  Recovering well  Some cough with clear sputum  No hemoptysis   Uses Albuterol BID         From prior visit:   80years old female had CT chest 12/4/2018 for SOB. CT reviewed by me and showed growing RML nodule. Followed by PET scan 3/23/19 reviewed by me and showed no metabolic activities. History of L breast cancer 7-10 years ago underwent surgery followed by radiation. Quit smoking 4 years ago. Smoked 1 ppd for 20 years. No weight loss. Uses Albuterol PRN. Mild CHILDS, able to walk as far as she wants. Dyspnea worse with exertion and better with resting. No associated cough or wheezes. Past Medical History:   Diagnosis Date    Adenocarcinoma, lung, right ([address] 07/2019    RML    Breast cancer ([facility] [address] 2008    radiation and lumpectomy-LEFT    Difficult intravenous access     Hypercholesteremia     Mass of right lung 7/23/2019    Nodule of upper lobe of right lung     S/P thoracotomy 07/2019    RML removed       Past Surgical History:        Procedure Laterality Date    BREAST LUMPECTOMY Left 2008    ENDOMETRIAL BIOPSY  08/11/2008    KIDNEY SURGERY Left     ablation    OVARY REMOVAL Bilateral 04/08/2009    for multiloculated serous cystadenoma    REVISION TOTAL KNEE ARTHROPLASTY Right 07/2016    THORACOSCOPY Right 7/23/2019    Dr. Omari Singletary - video assisted w/removal of RML, MSLND    THYROIDECTOMY      PARTIAL    TOTAL KNEE ARTHROPLASTY Right 10/2014    TUMOR EXCISION      from head       Allergies:  has No Known Allergies. Social History:    TOBACCO:   reports that she quit smoking about 4 years ago. Her smoking use included cigarettes. She has a 20.00 pack-year smoking history.  She has never used smokeless tobacco.  ETOH:   reports that she drinks alcohol. Family History:       Problem Relation Age of Onset    Cancer Mother         mutiple myeloma    Cancer Father         colon    Asthma Neg Hx     Diabetes Neg Hx     Emphysema Neg Hx     Heart Failure Neg Hx        Current Medications:    Current Outpatient Medications:     potassium chloride (KLOR-CON) 10 MEQ extended release tablet, Take 10 mEq by mouth daily, Disp: , Rfl:     budesonide-formoterol (SYMBICORT) 160-4.5 MCG/ACT AERO, Inhale 2 puffs into the lungs 2 times daily, Disp: , Rfl:     albuterol sulfate HFA (VENTOLIN HFA) 108 (90 Base) MCG/ACT inhaler, Inhale 2 puffs into the lungs every 6 hours as needed for Wheezing or Shortness of Breath, Disp: 1 Inhaler, Rfl: 6    famotidine (PEPCID) 20 MG tablet, Take 1 tablet by mouth 2 times daily, Disp: 60 tablet, Rfl: 0          Objective:   PHYSICAL EXAM:    Blood pressure 138/78, pulse 78, temperature 97.9 °F (36.6 °C), temperature source Oral, resp. rate 16, height 5' 3\" (1.6 m), weight 251 lb 6.4 oz (114 kg), SpO2 96 %.' on RA  Gen: No distress. Eyes: PERRL. No sclera icterus. No conjunctival injection. ENT: No discharge. Pharynx clear. Neck: Trachea midline. No obvious mass. Resp: No accessory muscle use. No crackles. No wheezes. No rhonchi. No dullness on percussion. Good air entry. Surgery incisions well-healed  CV: Regular rate. Regular rhythm. No murmur or rub. No edema. GI: Non-tender. Non-distended. No hernia. Skin: Warm and dry. No nodule on exposed extremities. Lymph: No cervical LAD. No supraclavicular LAD. M/S: No cyanosis. No joint deformity. No clubbing. Neuro: Awake. Alert. Moves all four extremities. Psych: Oriented x 3. No anxiety.            DATA reviewed by me:   PFTs 04/11/19 FVC (%) FEV1 1.59 (67 %) FEV1/FVC 70% TLC 4.53 (89 %) DLCO 18.01 (80 %) 6MW 1260 F low O2 90%      CT chest 12/4/18   Increased size of 7 mm right middle lobe  3 mm right lower

## 2019-10-11 ENCOUNTER — TELEPHONE (OUTPATIENT)
Dept: PULMONOLOGY | Age: 69
End: 2019-10-11

## 2019-10-11 DIAGNOSIS — C34.91 ADENOCARCINOMA OF RIGHT LUNG (HCC): Primary | ICD-10-CM

## 2019-11-06 PROCEDURE — 9900000038 HC CARDIAC REHAB PHASE 3 - MONTHLY

## 2019-11-25 ENCOUNTER — HOSPITAL ENCOUNTER (OUTPATIENT)
Dept: CARDIAC REHAB | Age: 69
Discharge: HOME | End: 2019-11-25

## 2020-01-13 ENCOUNTER — HOSPITAL ENCOUNTER (OUTPATIENT)
Dept: CT IMAGING | Age: 70
Discharge: HOME | End: 2020-01-13
Payer: MEDICARE

## 2020-01-13 ENCOUNTER — OFFICE VISIT (OUTPATIENT)
Dept: PULMONOLOGY | Age: 70
End: 2020-01-13
Payer: MEDICARE

## 2020-01-13 ENCOUNTER — TELEPHONE (OUTPATIENT)
Dept: PULMONOLOGY | Age: 70
End: 2020-01-13

## 2020-01-13 VITALS
DIASTOLIC BLOOD PRESSURE: 76 MMHG | WEIGHT: 251 LBS | BODY MASS INDEX: 44.47 KG/M2 | HEART RATE: 75 BPM | RESPIRATION RATE: 17 BRPM | OXYGEN SATURATION: 93 % | SYSTOLIC BLOOD PRESSURE: 124 MMHG | HEIGHT: 63 IN

## 2020-01-13 PROCEDURE — 1036F TOBACCO NON-USER: CPT | Performed by: INTERNAL MEDICINE

## 2020-01-13 PROCEDURE — 4040F PNEUMOC VAC/ADMIN/RCVD: CPT | Performed by: INTERNAL MEDICINE

## 2020-01-13 PROCEDURE — G8400 PT W/DXA NO RESULTS DOC: HCPCS | Performed by: INTERNAL MEDICINE

## 2020-01-13 PROCEDURE — 3017F COLORECTAL CA SCREEN DOC REV: CPT | Performed by: INTERNAL MEDICINE

## 2020-01-13 PROCEDURE — G8417 CALC BMI ABV UP PARAM F/U: HCPCS | Performed by: INTERNAL MEDICINE

## 2020-01-13 PROCEDURE — G8482 FLU IMMUNIZE ORDER/ADMIN: HCPCS | Performed by: INTERNAL MEDICINE

## 2020-01-13 PROCEDURE — G8926 SPIRO NO PERF OR DOC: HCPCS | Performed by: INTERNAL MEDICINE

## 2020-01-13 PROCEDURE — 3023F SPIROM DOC REV: CPT | Performed by: INTERNAL MEDICINE

## 2020-01-13 PROCEDURE — G8427 DOCREV CUR MEDS BY ELIG CLIN: HCPCS | Performed by: INTERNAL MEDICINE

## 2020-01-13 PROCEDURE — 1090F PRES/ABSN URINE INCON ASSESS: CPT | Performed by: INTERNAL MEDICINE

## 2020-01-13 PROCEDURE — 99214 OFFICE O/P EST MOD 30 MIN: CPT | Performed by: INTERNAL MEDICINE

## 2020-01-13 PROCEDURE — 1123F ACP DISCUSS/DSCN MKR DOCD: CPT | Performed by: INTERNAL MEDICINE

## 2020-01-13 PROCEDURE — 71250 CT THORAX DX C-: CPT

## 2020-01-13 NOTE — PROGRESS NOTES
[facility]                                                                    CHIEF COMPLAINT: Follow-up CT chest         HPI:   CT chest reviewed by me and noted below. Results were dicussed with patient and multiple good questions were answered. Doing good  No cough or sputum  No hemoptysis   Uses Albuterol 2 times/week         From prior visit:   80years old female had CT chest 12/4/2018 for SOB. CT reviewed by me and showed growing RML nodule. Followed by PET scan 3/23/19 reviewed by me and showed no metabolic activities. History of L breast cancer 7-10 years ago underwent surgery followed by radiation. Quit smoking 4 years ago. Smoked 1 ppd for 20 years. No weight loss. Uses Albuterol PRN. Mild CHILDS, able to walk as far as she wants. Dyspnea worse with exertion and better with resting. No associated cough or wheezes. Past Medical History:   Diagnosis Date    Adenocarcinoma, lung, right ([address] 07/2019    RML    Breast cancer ([facility] [address] 2008    radiation and lumpectomy-LEFT    Difficult intravenous access     Hypercholesteremia     Mass of right lung 7/23/2019    Nodule of upper lobe of right lung     S/P thoracotomy 07/2019    RML removed       Past Surgical History:        Procedure Laterality Date    BREAST LUMPECTOMY Left 2008    ENDOMETRIAL BIOPSY  08/11/2008    KIDNEY SURGERY Left     ablation    OVARY REMOVAL Bilateral 04/08/2009    for multiloculated serous cystadenoma    REVISION TOTAL KNEE ARTHROPLASTY Right 07/2016    THORACOSCOPY Right 7/23/2019    Dr. Juana Dill - video assisted w/removal of RML, MSLND    THYROIDECTOMY      PARTIAL    TOTAL KNEE ARTHROPLASTY Right 10/2014    TUMOR EXCISION      from head       Allergies:  has No Known Allergies. Social History:    TOBACCO:   reports that she quit smoking about 4 years ago. Her smoking use included cigarettes. She has a 20.00 pack-year smoking history.  She has versus atelectasis    PET scan 3/23/19  RUL 8 mm nodule with no significant metabolic activities    CT chest 6/13/2019   No evidence of adenopathy  RML 9 mm nodule, increased in size from CT 2/27/2019    CT chest 1/13/2020  images reviewed by me and showed:   Right lower lobe 1.5 x 0.8 cm irregular shaped bandlike opacity    RML lobectomy 7/23/2019   A. Level 10R lymph node:     - One benign lymph node. B. Level 11R lymph node:     - One benign lymph node. C. Right middle lobe lung, lobectomy:     - Invasive well-differentiated pulmonary adenocarcinoma, acinar       predominant - tumor mass is 1.0 cm in maximal dimensions.     - Separate focus of lepidic predominant adenocarcinoma - 0.9 cm in       maximal dimensions.     - Tumor does not involve the pleural surface.     - The bronchial margin and hilar vascular margins are negative.     - Two benign hilar lymph nodes. Assessment:       · RML 9 mm nodule on CT 2/27/19. Grown from CT 11/27/17, however not significantly changed between CT 12/4/18 and PET scan 3/23/19. Negative PET scan 3/23/19- SUV 1. Growing on repeat CT 6/13/2019. RML lobectomy 7/23/2019 showed invasive well-differentiated pulmonary adenocarcinoma [id number]. · Abnormal CT chest 1/13/2020 with right lower lobe irregular shaped bandlike opacity-favoring postoperative changes  · Moderate COPD  · History of L breast cancer 7-10 years ago underwent surgery followed by radiation. · 20 pack year smoking- quit 2015       Plan:       · Repeat surveillance CT chest in 3 months - 4/2020  · Compare recent CT with prior CT from 6/13/2019  · Symbicort and Albuterol 2 puffs Q4-6 hrs PRN  · Pulmonary rehab referral   · Patient is up to date with Pneumococcal vaccine and influenza vaccine   · Advised to continue with smoking cessation.    · She see Dr. Drea Feliz

## 2020-01-13 NOTE — TELEPHONE ENCOUNTER
1) Compare recent CT with prior CT from 6/13/2019. Disc will need taken to radiology to upload to PACS for comparison    2) Repeat surveillance CT chest in 3 months - 4/2020. Pt was scheduled for 6 month so appts need r/s. Assessment:       · RML 9 mm nodule on CT 2/27/19. Grown from CT 11/27/17, however not significantly changed between CT 12/4/18 and PET scan 3/23/19. Negative PET scan 3/23/19- SUV 1. Growing on repeat CT 6/13/2019. RML lobectomy 7/23/2019 showed invasive well-differentiated pulmonary adenocarcinoma [id number]. · Abnormal CT chest 1/13/2020 with right lower lobe irregular shaped bandlike opacity-favoring postoperative changes  · Moderate COPD  · History of L breast cancer 7-10 years ago underwent surgery followed by radiation. · 20 pack year smoking- quit 2015       Plan:       · Repeat surveillance CT chest in 3 months - 4/2020  · Compare recent CT with prior CT from 6/13/2019  · Symbicort and Albuterol 2 puffs Q4-6 hrs PRN  · Pulmonary rehab referral   · Patient is up to date with Pneumococcal vaccine and influenza vaccine   · Advised to continue with smoking cessation.    · She see Dr. Darryl Ty

## 2020-01-14 NOTE — TELEPHONE ENCOUNTER
Disc was taken to radiology to upload to PACS watch for addendum      Ct chest and f/u r/s to 4/22/2020

## 2020-01-16 NOTE — TELEPHONE ENCOUNTER
Comparison completed for review    Signed by Judy Loya MD on 01/15/20 1613      ADDENDUM:  Comparison studies from 06/13/2019 have become available. The previous 9 mm  nodule in the right middle lobe has resolved (likely inflammatory). No right  middle lobe nodule is identified on the present 01/13/2020 study. No  follow-up for this previous lesion is recommended.     A 3 mm nodule in the right lower lobe (image 55, series 3) is stable. No  follow-up is recommended for this lesion.     The 1.5 x 0.8 cm irregular opacity in the right lower lobe is again noted  (image 83, series 3). This is new or at least increased compared with  06/13/2019. It is favored to represent atelectasis or inflammation. 3 to  six-month follow-up CT is still recommended.

## 2020-04-16 ENCOUNTER — TELEPHONE (OUTPATIENT)
Dept: PULMONOLOGY | Age: 70
End: 2020-04-16

## 2020-04-22 ENCOUNTER — HOSPITAL ENCOUNTER (OUTPATIENT)
Dept: CT IMAGING | Age: 70
Discharge: HOME | End: 2020-04-22
Payer: MEDICARE

## 2020-04-22 ENCOUNTER — VIRTUAL VISIT (OUTPATIENT)
Dept: PULMONOLOGY | Age: 70
End: 2020-04-22
Payer: MEDICARE

## 2020-04-22 ENCOUNTER — TELEPHONE (OUTPATIENT)
Dept: PULMONOLOGY | Age: 70
End: 2020-04-22

## 2020-04-22 VITALS — BODY MASS INDEX: 44.3 KG/M2 | TEMPERATURE: 97 F | HEIGHT: 63 IN | WEIGHT: 250 LBS

## 2020-04-22 PROCEDURE — 99443 PR PHYS/QHP TELEPHONE EVALUATION 21-30 MIN: CPT | Performed by: INTERNAL MEDICINE

## 2020-04-22 PROCEDURE — 71250 CT THORAX DX C-: CPT

## 2020-04-22 RX ORDER — ALBUTEROL SULFATE 90 UG/1
2 AEROSOL, METERED RESPIRATORY (INHALATION) EVERY 6 HOURS PRN
Qty: 1 INHALER | Refills: 6 | Status: SHIPPED | OUTPATIENT
Start: 2020-04-22 | End: 2020-11-04

## 2020-04-22 NOTE — PROGRESS NOTES
pulmonary adenocarcinoma, acinar       predominant - tumor mass is 1.0 cm in maximal dimensions.     - Separate focus of lepidic predominant adenocarcinoma - 0.9 cm in       maximal dimensions.     - Tumor does not involve the pleural surface.     - The bronchial margin and hilar vascular margins are negative.     - Two benign hilar lymph nodes. Assessment:       · Abnormal CT chest 1/13/2020 with right lower lobe irregular shaped bandlike opacity-favoring atelectasis. Stable on repeat CT chest 4/22/2020  · RLL 3 mm nodule stable since 6/13/2019. Most recent CT 4/22/2020. · Moderate COPD  · RML 9 mm nodule on CT 2/27/19. Grown from CT 11/27/17, however not significantly changed between CT 12/4/18 and PET scan 3/23/19. Negative PET scan 3/23/19- SUV 1. Growing on repeat CT 6/13/2019. RML lobectomy 7/23/2019 showed invasive well-differentiated pulmonary adenocarcinoma [id number]. · History of L breast cancer 7-10 years ago underwent surgery followed by radiation. · 20 pack year smoking- quit 2015       Plan:       · Repeat surveillance CT chest October 2020  · D/C Symbicort   · Continue Albuterol 2 puffs Q4-6 hrs PRN  · Patient is up to date with Pneumococcal vaccine and influenza vaccine   · Advised to continue with smoking cessation. · She see Dr. Osei Naik is a 77 y.o. female evaluated via telephone on 4/22/2020.       Consent:  She and/or health care decision maker is aware that that she may receive a bill for this telephone service, depending on her insurance coverage, and has provided verbal consent to proceed: Yes       Documentation:  I communicated with the patient and/or health care decision maker about: See above   Details of this discussion including any medical advice provided: See above       I Affirm this is a Patient Initiated Episode with an Established Patient who has not had a related appointment within my department in the past 7 days or scheduled within the next 24 hours.     Total Time:21-30 minutes     Note: not billable if this call serves to triage the patient into an appointment for the relevant concern      Jenny Ko

## 2020-04-22 NOTE — TELEPHONE ENCOUNTER

## 2020-09-23 ENCOUNTER — TELEPHONE (OUTPATIENT)
Dept: WOMENS IMAGING | Age: 70
End: 2020-09-23

## 2020-09-23 NOTE — TELEPHONE ENCOUNTER
[address]  [address] [place] [address], [address] [place] [address]  [address]   Phone: [phone]          NAME:  Linsey Olmos  YOB: 1950  MEDICAL RECORD NUMBER:  [id number]  TODAY'S DATE:  9/23/2020      Referring Physician: Dr. Acosta Tafoya    Procedure: Stereotactic Bx    Right breast    Date of biopsy: 9/24/20    Patient taking blood thinners: no    Medicine allergies: no    Special Instructions: n/a    Reviewed pre and post biopsy instructions/information with patient. Pt verbalized understanding. Biopsy order form faxed to referring MD.      Stereotactic Biopsy Education     What is it? Stereotactic breast biopsy is a test that uses imaging, such as  X-ray, to find an area of your breast where a tissue sample will be taken. The sample is viewed  under a microscope to check for signs of breast cancer. Why is this test done? This type of breast biopsy is usually done to check for cancer in a lump or microcalcifications found during a mammogram.     How can you prepare for the test?     You may take your regular medications as prescribed.  You may eat and drink before the test.   Take a shower the evening or morning before the biopsy. 1.   2. What happens before the test?   Mammogram images are taken to find the exact site for the biopsy. · Your skin is washed with an alcohol prep.  You will be given an injection of medication to numb your breast.     1. What happens during the test?  When your breast is numb, a small cut is made in the skin.  Using the imaging, the doctor will guide the needle into the biopsy area.  A sample of breast tissue is taken through the needle.  A small clip is inserted into your breast to brian the biopsy site.  The needle is removed and pressure put on the needle site to stop any bleeding.  Steri Strips and a bandage will be placed over the site. How long does the test take?  About 60 minutes. Most of the time is spent preparing for the images and finding the area for the biopsy. 1.  What are the risks?  Bleeding: You may have some bleeding which can cause bruising, swelling, or hematoma.  Infection: Signs of infection are redness, swelling, heat, or increasing pain at site.  Sample is not adequate: This would require repeating the biopsy. What happens after the test?  The nurse will review your post biopsy instructions which include:        Placing an ice pack in your bra.   Wearing a firm fitting bra.   You may use Tylenol (Acetaminiphen) for discomfort. Lab results take about 2-3 business days. The Breast Navigator or your doctor will call you with the results. Pre Stereotactic Breast Biopsy:     (Please arrive 15 minutes early)                                                 [x] Blood thinner history reviewed with patient    [x] Take all your other medications on your normal routine schedule. [x] You may eat and drink as normal before your biopsy. [x] You may drive yourself. [x] Take a shower the night before or the morning of the biopsy. [x] No heavy lifting or exercise for 48 hours after the biopsy. [x] Printed Pre Stereotactic Biopsy Instructions were provided, reviewed with the patient and all questions were answered. Citlaly Nolan  9/23/2020  2:25 PM      [facility] Information:   Should you experience any significant changes in your health or have questions about your care, please contact:  Bhargavi Sevilla or Lauren Cervantes, Breast Navigators with [facility]  [phone]  Monday-Friday. If you need help with your care outside these hours and cannot wait until we are again available, contact your Physician or go to the hospital emergency room.

## 2020-09-24 ENCOUNTER — HOSPITAL ENCOUNTER (OUTPATIENT)
Dept: WOMENS IMAGING | Age: 70
Discharge: HOME | End: 2020-09-24
Payer: MEDICARE

## 2020-09-24 PROCEDURE — 76098 X-RAY EXAM SURGICAL SPECIMEN: CPT

## 2020-09-24 PROCEDURE — 77065 DX MAMMO INCL CAD UNI: CPT

## 2020-09-24 PROCEDURE — 88341 IMHCHEM/IMCYTCHM EA ADD ANTB: CPT

## 2020-09-24 PROCEDURE — 88342 IMHCHEM/IMCYTCHM 1ST ANTB: CPT

## 2020-09-24 PROCEDURE — 2709999900 MAM STEREO PLACE BREAST LOC DEVICE 1ST LESION RIGHT

## 2020-09-24 PROCEDURE — 88360 TUMOR IMMUNOHISTOCHEM/MANUAL: CPT

## 2020-09-24 PROCEDURE — A4648 IMPLANTABLE TISSUE MARKER: HCPCS

## 2020-09-24 PROCEDURE — 88305 TISSUE EXAM BY PATHOLOGIST: CPT

## 2020-09-28 ENCOUNTER — TELEPHONE (OUTPATIENT)
Dept: WOMENS IMAGING | Age: 70
End: 2020-09-28

## 2020-09-28 NOTE — PROGRESS NOTES
BREAST SURGICAL ONCOLOGY NEW PATIENT EVALUATION    10/01/20     Chief Complaint: DCIS    History of Present Illness  Zeferino Grimes is a 72 y.o. 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 Ms. Nicolle Hahn underwent her routine screening mammogram on 11/25/2019 at [facility]. This revealed surgical changes in the left breast, increased coarse calcifications far posteriorly, incompletely evaluated. She subsequently underwent right diagnostic mammogram on 12/30/2019 which revealed that the microcalcifications previously described in the far posterior inferior right breast are difficult to identify, a cluster of calcifications is observed on the magnification MLO view, but no calcifications were identified on magnification CC views, repeat standard MLO view suggest there are subtle calcifications in the far posterior breast, chronicity is uncertain as this area has not been reliably imaged on prior mammograms, BIRADS 3. She then went for 6-month follow up (which was a little delayed) right diagnostic mammogram, and right breast US on 8/18/2020. This follow-up mammogram revealed increased cluster of pleomorphic calcifications, stereotactic biopsy is recommended. US did not show any corresponding lesion. 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 A clip was placed. Post-biopsy mammogram revealed the clip did migrate by 1 cm anteriorly and 1.2 cm medially, relative to the remaining calcification. She denies breast symptoms including palpating any breast masses, skin/nipple changes, nipple discharge or breast pain. No breast biopsies prior to this (except for 2010 - below). She is asymptomatic today and denies any systemic complaints including weight loss, bone pain and headaches. I reviewed her medical records.  She has a personal history of left breast DCIS, moderate-high grade, s/p partial mastectomy on 10/19/2010 with Dr. Loreta Arguello [facility]). This was reportedly 1.2 cm on final path, ER+ and SC-. She states that she then had to go back for reexcision of margins. She completed radiation in Dec 2010 (reported she had 2 months of this), and then took Arimidex x 5 years. She follows with Darryle So, MD (medical oncology) at [facility]. She also has a history of lung cancer s/p right middle lobectomy in 2019 . Quit smoking 4 years ago. She never had genetic testing done. Family history is significant for multiple myeloma in mother, and prostate cancer in fathter (she does not think this was metastatic). No history of ovarian or breast cancer in her family. She does have some CHILDS, especially if she is carrying things. Improves with rest. Only uses her inhalers PRN (about 2x per week). Also she has a history of thyroid cancer s/p thyroid lobectomy in 2009. PCP - [facility] - Darryle So, MD  [address] [place] [address], MD       REVIEW OF SYSTEMS  Constitutional: Negative for chills, fatigue, fever and unexpected weight change. Eyes: Negative for visual disturbance. Respiratory: Negative for apnea, cough, shortness of breath and wheezing. Cardiovascular: Negative for chest pain, palpitations and leg swelling. Gastrointestinal: Negative for abdominal distention, abdominal pain, nausea and vomiting. Musculoskeletal: Negative for arthralgias, back pain, gait problem and neck pain. Skin: Negative for rash and wound. Neurological: Negative for syncope, weakness, numbness and headaches. Hematological: Negative for adenopathy. Psychiatric/Behavioral: Negative for confusion and dysphoric mood. The patient is not nervous/anxious. I personally reviewed and agree with the above ROS as documented by my nurse.     Obstetric/Gynecologic History  Number of pregnancies: 1  Births: 1  Miscarriages: 0  Abortions: 1  Age at First Birth: 25  Age at Menstruation:  17  Still Menstruating? No, age of menopause 55  Hysterectomy? No - but ovaries removed (age 49)    Age at first mammogram: Unsure  Frequency of mammograms: yearly  Bra/Cup size: 42C    History of breastfeeding? No   History of OCP Use? Yes for 10 years and is not currently taking  History of hormone use? Never. Past Medical History      Diagnosis Date    Adenocarcinoma, lung, right ([facility] [address] 07/2019    RML    Breast cancer ([facility] [address] 2008    radiation and lumpectomy-LEFT    COPD (chronic obstructive pulmonary disease) ([facility] [address] [facility]     Difficult intravenous access     Hypercholesteremia     Mass of right lung 7/23/2019    Nodule of upper lobe of right lung     S/P thoracotomy 07/2019    RML removed        Past Surgical History      Procedure Laterality Date    BREAST LUMPECTOMY Left 2008    ENDOMETRIAL BIOPSY  08/11/2008    KIDNEY SURGERY Left     ablation    OVARY REMOVAL Bilateral 04/08/2009    for multiloculated serous cystadenoma    REVISION TOTAL KNEE ARTHROPLASTY Right 07/2016    THORACOSCOPY Right 7/23/2019    Dr. Karina Leblanc - video assisted w/removal of RML, MSLND    THYROIDECTOMY      PARTIAL    TOTAL KNEE ARTHROPLASTY Right 10/2014    TUMOR EXCISION      from head        Social History  Patient  reports that she quit smoking about 5 years ago. Her smoking use included cigarettes. She has a 20.00 pack-year smoking history. She has never used smokeless tobacco. She reports current alcohol use. She reports that she does not use drugs. Lives w her , 1.5 hours from [facility]. Family History  Family History   Problem Relation Age of Onset    Cancer Mother         mutiple myeloma    Cancer Father         colon    Asthma Neg Hx     Diabetes Neg Hx     Emphysema Neg Hx     Heart Failure Neg Hx        Ashkenazi Faith descent?  No     Current Medications  Current Outpatient Medications   Medication Sig Dispense Refill    albuterol sulfate HFA (VENTOLIN HFA) 108 (90 Base) MCG/ACT inhaler Inhale 2 puffs into the lungs every 6 hours as needed for Wheezing or Shortness of Breath 1 Inhaler 6    VITAMIN D PO Take by mouth      potassium chloride (KLOR-CON) 10 MEQ extended release tablet Take 10 mEq by mouth daily      albuterol sulfate HFA (VENTOLIN HFA) 108 (90 Base) MCG/ACT inhaler Inhale 2 puffs into the lungs every 6 hours as needed for Wheezing or Shortness of Breath 1 Inhaler 6     No current facility-administered medications for this visit. Allergies  No Known Allergies    PHYSICAL EXAMINATION:  VS: BP (!) 158/91 (Site: Right Upper Arm, Position: Sitting, Cuff Size: Medium Adult)   Pulse 68   Temp 97.9 °F (36.6 °C) (Temporal)   Resp 18   Ht 5' 3\" (1.6 m)   Wt 259 lb (117.5 kg)   SpO2 92%   BMI 45.88 kg/m²     General: Well-developed, well-nourished, in no apparent distress. Head: The head is normocephalic  Neck: Supple with no thyromegaly or adenopathy  Respiratory: Normal respiratory effort, chest expands symmetrically, lungs are clear to auscultation bilaterally  Cardiovascular: Regular rate and rhythm. No lower extremity edema. Abdomen: Soft, non-tender, non-distended, obese  Psychiatric: Alert and oriented x 3, appropriate affect and behavior   Musculoskeletal: Normal gait and range of motion in all 4 extremities. Skin: Warm and dry  Lymphatics: The supraclavicular, submental, cervical and axillary regions are free of significant lymphadenopathy  Right Breast: Examined with patient seated and supine. The skin, nipple, and areola appear normal, there is no skin dimpling with movement of the pectoralis, there is no nipple retraction, no nipple discharge can be elicited, the parenchyma is mildly nodular, there are no dominant masses and the axillary tail is normal. Biopsy site with mild ecchymosis at 3:00. Left Breast: Examined with patient seated and supine.  The skin, nipple, and areola appear normal, there is no skin dimpling with movement of the pectoralis, there is no nipple retraction, no nipple discharge can be elicited, the parenchyma is mildly nodular, there are no dominant masses and the axillary tail is normal.     ---------------------------------------------    IMAGING: Imaging was performed at [facility]. I personally reviewed those images in PACS, and the reports she brought with her today (scanned in her chart). Routine screening mammogram on 11/25/2019:  Surgical changes in the left breast, increased coarse calcifications far posteriorly, incompletely evaluated. Right diagnostic mammogram on 12/30/2019: Microcalcifications previously described in the far posterior inferior right breast are difficult to identify, a cluster of calcifications is observed on the magnification MLO view, but no calcifications were identified on magnification CC views, repeat standard MLO view suggest there are subtle calcifications in the far posterior breast, chronicity is uncertain as this area has not been reliably imaged on prior mammograms, BIRADS 3. Right diagnostic mammogram 8/18/2020: Revealed increased cluster of pleomorphic calcifications, stereotactic biopsy is recommended. Targeted breast US 8/18/2020: Reviewed with Dr. Rosemary Stark today -- [address] [facility] did not show any corresponding lesion. I personally reviewed the images from her stereotactic biopsy performed on 9/24/2020. Post-biopsy mammogram revealed that the clip did migrate. I also personally reviewed the left diagnostic mammogram performed here today. Stable benign posttreatment changes, no new suspicious findings. Density is described as average/scattered. PATHOLOGY:  I personally reviewed the pathology report from her right breast biopsy from 9/24/2020 with her today as well. This demonstrated ductal carcinoma in-situ, grade 3, ER positive, NC not tested, HER2 not tested    ----------------------------------------------    IMPRESSION:   72 y.o. female with:  1.  DCIS, right recommend that we follow-up in 6 months for a mammogram.  She understands that if she pursued this option, she could also require more biopsies in the future. We also discussed adjuvant therapy. I explained to her that we do recommend adjuvant radiation as part of breast conservation to decrease local recurrence. She reports that she previously had 2 months of left breast radiation in 2010. She prefers to avoid radiation because she states that her equilibrium (balance) was thrown off by the radiation. She also felt like 2 months of treatment was too long. We talked about how in most situations radiation treatments are 3 to 4 weeks, however sometimes may be as long as 6 weeks. There are also various options ranging from partial breast to whole breast radiation. If she is truly against having radiation following a lumpectomy, we talked about the option of sending an OncotypeDCIS after surgery, which would give us an idea of recurrence risk s/p breast conservation surgery. Additionally we may also feel more comfortable omitting radiation if her margins are widely clear. I have offered to have her meet with radiation oncology preoperatively to discuss these options further. She would like to do this. Systemic therapy including chemotherapy and endocrine therapy were reviewed. She will certainly be a candidate for endocrine therapy. Chemotherapy is not indicated for DCIS, but we will await the final pathology. I will arrange for a medical oncology consultation post-operatively to discuss this further. After a thorough discussion of the diagnosis, treatment options, Ms. Hi Stout would like some additional time to think about treatment options. In summary, options we discussed were:  1. Partial mastectomy followed by radiation   2. Partial mastectomy without radiation - not standard  3. Right mastectomy with SLN bx   4. Bilateral mastectomy with right SLN bx  5.  Endocrine therapy only - not standard  6. Doing nothing - not standard    We did discuss some of the risks of surgery including but not limited to: Reactions to medications/anesthesia, pain, infection, bleeding, wound complications, lymphedema, numbness, poor cosmetic outcome. We also talked about expected recovery of mastectomy versus lumpectomy. We discussed that she will need to see her PCP within 30 days of surgery to obtain pre-operative clearance. Additionally, the patient was counseled at length about the risks of jorge COVID-19 during their perioperative period and any recovery window from their procedure. The patient was made aware that jorge COVID-19  may worsen their prognosis for recovering from their procedure  and lend to a higher morbidity and/or mortality risk. All material risks, benefits, and reasonable alternatives including postponing the procedure were discussed. The patient was also made aware of our current COVID testing policy. She will require COVID testing prior to surgery and was given information on how to have this done. She last had left breast imaging at the time of her bilateral screening mammogram in November 2019. Since it has been >6 months since she had any left breast imaging, will repeat her left breast mammogram today. This will also allow us to get her back on an annual bilateral screening schedule following surgery. Finally, we also discussed her personal and family history and the role of genetic counseling and testing. Based on her risk, she declines at this time. I answered all of her and her family's questions thoroughly, and they do seem pleased with this plan of a approach. I encouraged her to contact me in the interim if any new questions or concerns arise. Over 60 minutes of time were spent in this visit of which 50% or more of the time was related to counseling and coordination of care.     IN SUMMARY:  -  She will call me within a week to let me know which treatment option she would like to pursue  - Declined genetics referral  - Referred to radiation oncology (Dr. Cher Gunter) for additional information re: radiation    Sarah Pineda.  Randall Pascal,   Breast Surgical Oncology    [facility] Breast Surgery  Phone: [phone] (option 3)

## 2020-09-29 ENCOUNTER — TELEPHONE (OUTPATIENT)
Dept: SURGERY | Age: 70
End: 2020-09-29

## 2020-09-29 NOTE — TELEPHONE ENCOUNTER
[phone] ([phone]) . Spoke with Camille in medical records. She will fax over all mammo and US results , report from Nov 2019-present. May have a hard time finding of pathology from 10/19/2010.    Carla

## 2020-09-30 PROBLEM — D05.11 BREAST NEOPLASM, TIS (DCIS), RIGHT: Status: ACTIVE | Noted: 2020-09-30

## 2020-09-30 NOTE — TELEPHONE ENCOUNTER
Hospital called , stated the faxed wouldn't go thru . Patient called and said she would pick them up today. I called medical records , they will have them ready for her today for . She will bring them to her appt tomorrow.   Nilo Staff

## 2020-09-30 NOTE — PROGRESS NOTES
Obstetric/Gynecologic History  Number of pregnancies: 1  Births: 1  Miscarriages: 0  Abortions: 1  Age at First Birth:  25  Age at Menstruation:  17  Still Menstruating? No, age of menopause 55  Hysterectomy? Yes - and ovaries removed    Age at first mammogram: ? Frequency of mammograms: yearly  Bra/Cup size: 42C    History of breastfeeding? No   History of OCP Use? Yes for 10 years and is not currently taking  History of hormone use? Never.

## 2020-10-01 ENCOUNTER — OFFICE VISIT (OUTPATIENT)
Dept: SURGERY | Age: 70
End: 2020-10-01
Payer: MEDICARE

## 2020-10-01 ENCOUNTER — HOSPITAL ENCOUNTER (OUTPATIENT)
Dept: WOMENS IMAGING | Age: 70
Discharge: HOME | End: 2020-10-01
Payer: MEDICARE

## 2020-10-01 VITALS
WEIGHT: 259 LBS | OXYGEN SATURATION: 92 % | SYSTOLIC BLOOD PRESSURE: 158 MMHG | RESPIRATION RATE: 18 BRPM | TEMPERATURE: 97.9 F | DIASTOLIC BLOOD PRESSURE: 91 MMHG | HEART RATE: 68 BPM | BODY MASS INDEX: 45.89 KG/M2 | HEIGHT: 63 IN

## 2020-10-01 PROCEDURE — G0279 TOMOSYNTHESIS, MAMMO: HCPCS

## 2020-10-01 PROCEDURE — 99205 OFFICE O/P NEW HI 60 MIN: CPT | Performed by: SURGERY

## 2020-10-01 ASSESSMENT — ENCOUNTER SYMPTOMS
SHORTNESS OF BREATH: 0
BACK PAIN: 0
APNEA: 0
NAUSEA: 0
WHEEZING: 0
COUGH: 0
ABDOMINAL PAIN: 0
ABDOMINAL DISTENTION: 0
VOMITING: 0

## 2020-10-01 NOTE — LETTER
[facility]  [place] [address]  Phone: [phone]  Fax: [phone]    Jae Green DO        October 1, 2020     [facility]  No address on file    Patient: Zeferino Grimes  MR Number: <[id number]>  YOB: 1950  Date of Visit: 10/1/2020    Dear  [facility]:    I saw your patient Gurinder Yan today for right breast DCIS. Below are the relevant portions of my assessment and plan of care. If you have questions, please do not hesitate to call me. I look forward to [facility] along with you.     Sincerely,        Jae Green DO

## 2020-10-01 NOTE — PROGRESS NOTES
Review of Systems   Constitutional: Negative for chills, fatigue, fever and unexpected weight change. Eyes: Negative for visual disturbance. Respiratory: Negative for apnea, cough, shortness of breath and wheezing. Cardiovascular: Negative for chest pain, palpitations and leg swelling. Gastrointestinal: Negative for abdominal distention, abdominal pain, nausea and vomiting. Musculoskeletal: Negative for arthralgias, back pain, gait problem and neck pain. Skin: Negative for rash and wound. Neurological: Negative for syncope, weakness, numbness and headaches. Hematological: Negative for adenopathy. Psychiatric/Behavioral: Negative for confusion and dysphoric mood. The patient is not nervous/anxious.

## 2020-10-08 ENCOUNTER — TELEPHONE (OUTPATIENT)
Dept: SURGERY | Age: 70
End: 2020-10-08

## 2020-10-08 NOTE — TELEPHONE ENCOUNTER
TELEPHONE NOTE    I called to follow up with Vanessa Hehermelinda, now that she met with Dr. Priyank Figueroa yesterday. She said she has still not made any decisions, and has no additional questions at this time. She just needs some more time to think about her options. In the meantime, I told her that I will send her core needle biopsy specimen for OncotypeDCIS. I will call her again middle of next week to follow up, and asked her to call back in the meantime if she makes any decisions. Umer Ross.  Nicholas Bravo DO  Breast Surgical Oncology    [facility] Breast Surgery  Phone: [phone] (option 3)

## 2020-10-14 ENCOUNTER — TELEPHONE (OUTPATIENT)
Dept: SURGERY | Age: 70
End: 2020-10-14

## 2020-10-14 NOTE — TELEPHONE ENCOUNTER
TELEPHONE NOTE    I called to follow up with Aishwarya Donahue to see if she had any other questions or made a final decision. She decided to see Dr. Uma Bedolla since her friend is a nurse who works with Dr. Uma Bedolla, and will be having a lumpectomy with her. I told her that I am just pleased she will be getting care. Geovanni Marshall.  Shayne Becerra, DO  Breast Surgical Oncology    [facility] Breast Surgery  Phone: [phone] (option 3)

## 2020-10-27 ENCOUNTER — HOSPITAL ENCOUNTER (OUTPATIENT)
Age: 70
Setting detail: SPECIMEN
Discharge: HOME | End: 2020-10-27
Payer: MEDICARE

## 2020-11-02 ENCOUNTER — TELEPHONE (OUTPATIENT)
Dept: PULMONOLOGY | Age: 70
End: 2020-11-02

## 2020-11-02 NOTE — TELEPHONE ENCOUNTER
Within this Telehealth Consent, the terms you and yours refer to the person using the Telehealth Service (Service), or in the case of a use of the Service by or on behalf of a minor, you and yours refer to and include (i) the parent or legal guardian who provides consent to the use of the Service by such minor or uses the Service on behalf of such minor, and (ii) the minor for whom consent is being provided or on whose behalf the Service is being utilized. When using Service, you will be consulting with your health care providers via the use of Telehealth.   Telehealth involves the delivery of healthcare services using electronic communications, information technology or other means between a healthcare provider and a patient who are not in the same physical location. Telehealth may be used for diagnosis, treatment, follow-up and/or patient education, and may include, but is not limited to, one or more of the following:    Electronic transmission of medical records, photo images, personal health information or other data between a patient and a healthcare provider    Interactions between a patient and healthcare provider via audio, video and/or data communications    Use of output data from medical devices, sound and video files    Anticipated Benefits   The use of Telehealth by your Provider(s) through the Service may have the following possible benefits:    Making it easier and more efficient for you to access medical care and treatment for the conditions treated by such Provider(s) utilizing the Service    Allowing you to obtain medical care and treatment by Provider(s) at times that are convenient for you    Enabling you to interact with Provider(s) without the necessity of an in-office appointment     Possible Risks   While the use of Telehealth can provide potential benefits for you, there are also potential risks associated with the use of Telehealth.  These risks include, but may not be limited to the following:    Your Provider(s) may not able to provide medical treatment for your particular condition and you may be required to seek alternative healthcare or emergency care services.  The electronic systems or other security protocols or safeguards used in the Service could fail, causing a breach of privacy of your medical or other information.  Given regulatory requirements in certain jurisdictions, your Provider(s) diagnosis and/or treatment options, especially pertaining to certain prescriptions, may be limited. Acceptance   1. You understand that Services will be provided via Telehealth. This process involves the use of HIPAA compliant and secure, real-time audio-visual interfacing with a qualified and appropriately trained provider located at [facility]. 2. You understand that, under no circumstances, will this session be recorded. 3. You understand that the Provider(s) at [facility] and other clinical participants will be party to the information obtained during the Telehealth session in accordance with best medical practices. 4. You understand that the information obtained during the Telehealth session will be used to help determine the most appropriate treatment options. 5. You understand that You have the right to revoke this consent at any point in time. 6. You understand that Telehealth is voluntary, and that continued treatment is not dependent upon consent. 7. You understand that, in the event of non-consent to Telehealth services and/or technical difficulties, you will obtain services as typically provided in the absence of Telehealth technology. 8. You understand that this consent will be kept in Your medical record. 9. No potential benefits from the use of Telehealth or specific results can be guaranteed. Your condition may not be cured or improved and, in some cases, may get worse.    10. There are limitations in the provision of medical care and treatment via Telehealth and the Service and you may not be able to receive diagnosis and/or treatment through the Service for every condition for which you seek diagnosis and/or treatment. 11. There are potential risks to the use of Telehealth, including but not limited to the risks described in this Telehealth Consent. 12. Your Provider(s) have discussed the use of Telehealth and the Service with you, including the benefits and risks of such and you have provided oral consent to your Provider(s) for the use of Telehealth and the Service. 15. You understand that it is your duty to provide your Provider(s) truthful, accurate and complete information, including all relevant information regarding care that you may have received or may be receiving from other healthcare providers outside of the Service. 14. You understand that each of your Provider(s) may determine in his or sole discretion that your condition is not suitable for diagnosis and/or treatment using the Service, and that you may need to seek medical care and treatment a specialist or other healthcare provider, outside of the Service. 15. You understand that you are fully responsible for payment for all services provided by Provider(s) or through use of the Service and that you may not be able to use third-party insurance. 16. You represent that (a) you have read this Telehealth Consent carefully, (b) you understand the risks and benefits of the Service and the use of Telehealth in the medical care and treatment provided to you by Provider(s) using the Service, and (c) you have the legal capacity and authority to provide this consent for yourself and/or the minor for which you are consenting under applicable federal and state laws, including laws relating to the age of [de-identified] and/or parental/guardian consent.    17. You give your informed consent to the use of Telehealth by Provider(s) using the Service under the terms described in the Terms of Service and this Telehealth Consent. The patient was read the following statement and has consented to the visit as of 11/2/20. The patient has been scheduled for their first telehealth visit on 11/4/20 with Dr. Alona Boothe.

## 2020-11-04 ENCOUNTER — HOSPITAL ENCOUNTER (OUTPATIENT)
Dept: CT IMAGING | Age: 70
Discharge: HOME | End: 2020-11-04
Payer: MEDICARE

## 2020-11-04 ENCOUNTER — VIRTUAL VISIT (OUTPATIENT)
Dept: PULMONOLOGY | Age: 70
End: 2020-11-04
Payer: MEDICARE

## 2020-11-04 PROCEDURE — 99442 PR PHYS/QHP TELEPHONE EVALUATION 11-20 MIN: CPT | Performed by: INTERNAL MEDICINE

## 2020-11-04 PROCEDURE — 71250 CT THORAX DX C-: CPT

## 2020-11-04 RX ORDER — ALBUTEROL SULFATE 90 UG/1
2 AEROSOL, METERED RESPIRATORY (INHALATION) EVERY 6 HOURS PRN
Qty: 1 INHALER | Refills: 6 | Status: SHIPPED | OUTPATIENT
Start: 2020-11-04 | End: 2021-04-22 | Stop reason: SDUPTHER

## 2020-11-04 NOTE — PATIENT INSTRUCTIONS
Remember to bring a list of pulmonary medications and any CPAP or BiPAP machines to your next appointment with the office. Please keep all of your future appointments scheduled by [address], [facility] office. Out of respect for other patients and providers, you may be asked to reschedule your appointment if you arrive later than your scheduled appointment time. Appointments cancelled less than 24hrs in advance will be considered a no show. Patients with three missed appointments within 1 year or four missed appointments within 2 years can be dismissed from the practice. You may receive a survey regarding the care you received during your visit. Your input is valuable to us. We encourage you to complete and return your survey. We hope you will choose us in the future for your healthcare needs. Pt instructed of all future appointment dates & times, including radiology, labs, procedures & referrals. If procedures were scheduled preparation instructions provided. Instructions on future appointments with [facility] were given.

## 2020-11-04 NOTE — PROGRESS NOTES
middle lobe lung, lobectomy:     - Invasive well-differentiated pulmonary adenocarcinoma, acinar       predominant - tumor mass is 1.0 cm in maximal dimensions.     - Separate focus of lepidic predominant adenocarcinoma - 0.9 cm in       maximal dimensions.     - Tumor does not involve the pleural surface.     - The bronchial margin and hilar vascular margins are negative.     - Two benign hilar lymph nodes. Assessment:       · Abnormal CT chest 1/13/2020 with right lower lobe irregular shaped bandlike opacity-favoring atelectasis. Stable on repeat CT chest 11/4/2020   · RLL 3 mm nodule stable since 6/13/2019. Most recent CT 11/4/2020  · Moderate COPD  · RML 9 mm nodule on CT 2/27/19. Grown from CT 11/27/17, however not significantly changed between CT 12/4/18 and PET scan 3/23/19. Negative PET scan 3/23/19- SUV 1. Growing on repeat CT 6/13/2019. RML lobectomy 7/23/2019 showed invasive well-differentiated pulmonary adenocarcinoma [id number]. · History of L breast cancer 7-10 years ago underwent surgery followed by radiation. · 20 pack year smoking- quit 2015       Plan:       · Repeat surveillance CT chest 4/2021   · Continue Albuterol 2 puffs Q4-6 hrs PRN  · Patient is up to date with Pneumococcal vaccine and influenza vaccine   · Advised to continue with smoking cessation. · Followed up with Dr. Brendan Chen is a 72 y.o. female evaluated via telephone on 11/4/2020.       Consent:  She and/or health care decision maker is aware that that she may receive a bill for this telephone service, depending on her insurance coverage, and has provided verbal consent to proceed: Yes       Documentation:  I communicated with the patient and/or health care decision maker about: See above   Details of this discussion including any medical advice provided: See above       I Affirm this is a Patient Initiated Episode with an Established Patient who has not had a related appointment within my department in the past 7 days or scheduled within the next 24 hours.     Total Time:21-30 minutes     Note: not billable if this call serves to triage the patient into an appointment for the relevant concern      Al Andrews

## 2020-11-10 NOTE — PROGRESS NOTES
The [facility] / [facility] Emergency Department ([facility]) [address] [id number] [facility], [address] [place] [address]    Acknowledgment of Informed Consent for Surgical or Medical Procedure and Sedation  I agree to allow doctor(s) LELAND LOPEZ and his/her associates or assistants, including residents and/or other qualified medical practitioner to perform the following medical treatment or procedure and to administer or direct the administration of sedation as necessary:  Procedure(s): RIGHT BREAST LUMPECTOMY, PLACEMENT OF BIOHAZARD MARKER, MAMMOGRAM LATERALITY, 1 TAG, TARGET CALCIFICATIONS (CLIP IF NO RESIDUAL CALCIFICATIONS) (11/11/20) 10:00AM    My doctor has explained the following regarding the proposed procedure:   the explanation of the procedure   the benefits of the procedure   the potential problems that might occur during recuperation   the risks and side effects of the procedure which could include but are not limited to severe blood loss, infection, stroke or death   the benefits, risks and side effect of alternative procedures including the consequences of declining this procedure or any alternative procedures   the likelihood of achieving satisfactory results. I acknowledge no guarantee or assurance has been made to me regarding the results. I understand that during the course of this treatment/procedure, unforeseen conditions can occur which require an additional or different procedure. I agree to allow my physician or assistants to perform such extension of the original procedure as they may find necessary. I understand that sedation will often result in temporary impairment of memory and fine motor skills and that sedation can occasionally progress to a state of deep sedation or general anesthesia.     I understand the risks of anesthesia for surgery include, but are not limited to, sore throat, hoarseness, injury to face, mouth, or teeth; nausea; headache; injury to blood vessels or nerves; death, brain damage, or paralysis. I understand that if I have a Limitation of Treatment order in effect during my hospitalization, the order may or may not be in effect during this procedure. I give my doctor permission to give me blood or blood products. I understand that there are risks with receiving blood such as hepatitis, AIDS, fever, or allergic reaction. I acknowledge that the risks, benefits, and alternatives of this treatment have been explained to me and that no express or implied warranty has been given by the hospital, any blood bank, or any person or entity as to the blood or blood components transfused. At the discretion of my doctor, I agree to allow observers, equipment/product representatives and allow photographing, and/or televising of the procedure, provided my name or identity is maintained confidentially. I agree the hospital may dispose of or use for scientific or educational purposes any tissue, fluid, or body parts which may be removed.     ________________________________Date________Time______ am/pm  ([place] [facility])  Patient or Signature of Closest Relative or Legal Guardian    ________________________________Date________Time______am/pm      Page 1 of  1  Witness

## 2020-11-11 ENCOUNTER — NURSE ONLY (OUTPATIENT)
Dept: PRIMARY CARE CLINIC | Age: 70
End: 2020-11-11
Payer: MEDICARE

## 2020-11-11 ENCOUNTER — HOSPITAL ENCOUNTER (OUTPATIENT)
Dept: MAMMOGRAPHY | Age: 70
Discharge: HOME | End: 2020-11-11
Payer: MEDICARE

## 2020-11-11 PROCEDURE — 99211 OFF/OP EST MAY X REQ PHY/QHP: CPT | Performed by: NURSE PRACTITIONER

## 2020-11-11 PROCEDURE — 19281 PERQ DEVICE BREAST 1ST IMAG: CPT

## 2020-11-12 LAB — SARS-COV-2: NOT DETECTED

## 2020-11-13 RX ORDER — UBIDECARENONE 75 MG
50 CAPSULE ORAL DAILY
COMMUNITY

## 2020-11-13 NOTE — PROGRESS NOTES
H&P outdated. Done 10/8. Patient went back for labs and CXR, but no new H&P. [facility] for Candance March, at surgeon's. Does she have office note to be used for H&P or will Janet Mcdonald do H&P DOS?  11/16 Candance March, from Dr. Dominic Macias, faxed stress test and echo. Confirmed with PCP and [facility] that they have not seen patient for doctor visit in last 30 days. Spoke with Candance March, at Dr. Dominic Macias. She stated surgeon does not have office note within 30 days and H&P will need to be DOS. Notified Candance March that hospital does not have full H&P services for DOS, so we will need surgeon to do H&P DOS. General

## 2020-11-13 NOTE — PROGRESS NOTES
[facility] PRE-SURGICAL TESTING INSTRUCTIONS                              PRIOR TO PROCEDURE DATE:  1. Please follow any guidelines/instructions prior to your procedure as advised by your surgeon. 2. Arrange for someone to drive you home and be with you for the first 24 hours after discharge for your safety after your procedure for which you received sedation. Ensure it is someone we can share information with regarding your discharge. 3. You must contact your surgeon for instructions IF:   You are taking any blood thinners, aspirin, anti-inflammatory or vitamin E.   There is a change in your physical condition such as a cold, fever, rash, cuts, sores or any other infection, especially near your surgical site. 4. Do not drink alcohol the day before or day of your procedure. 5. A Pre-op History and Physical for surgery MUST be completed by your Physician or Urgent Care within 30 days of your procedure date. Please bring a copy with you on the day of your procedure and along with any other testing performed. THE DAY OF YOUR PROCEDURE:  1. Follow instructions for ARRIVAL TIME as DIRECTED BY YOUR SURGEON. I    2. Enter the MAIN entrance from [address] and follow the signs to the free [employer] or [employer] parking (offered free of charge 6am-5pm). 3. Enter the Main Entrance of the hospital (do not enter from the lower level of the parking garage). Upon entrance, check in with the  at the main desk on your left. If no one is available at the desk, proceed into the [facility] Waiting Room and go through the door directly into the [facility]. There is a Check-in desk ACROSS from Room 5 (marked with a sign hanging from the ceiling). The phone number for the surgery center is [phone]. 4. Please call [phone] option #2 option #2 if you have not been preregistered yet. On the day of your procedure bring your insurance card and photo ID.  You will be registered at your bedside once brought back to your room. 5. DO NOT EAT ANYTHING eight hours prior to surgery. May have 8 ounces of water 4 hours prior to surgery. 6. MEDICATIONS    Take the following medications with a SMALL sip of water:    Use your usual dose of inhalers the morning of surgery. BRING your rescue inhaler with you to hospital.    Anesthesia does NOT want you to take insulin the morning of surgery. They will control your blood sugar while you are at the hospital. Please contact your ordering physician for instructions regarding your insulin the night before your procedure. If you have an insulin pump, please keep it set on basal rate. 7. Do not swallow water when brushing teeth. No gum, candy, mints or ice chips. Refrain from smoking or at least decrease the amount. 8. Dress in loose, comfortable clothing appropriate for redressing after your procedure. Do not wear jewelry (including body piercings), make-up (especially NO eye make-up), fingernail polish (NO toenail polish if foot/leg surgery), lotion, powders or metal hairclips. 9. Dentures, glasses, or contacts will need to be removed before your procedure. Bring cases for your glasses, contacts, dentures, or hearing aids to protect them while you are in surgery. 10. If you use a CPAP, please bring it with you on the day of your procedure. 11. We recommend that valuable personal  belongings such as cash, cell phones, e-tablets or jewelry, be left at home during your stay. The hospital will not be responsible for valuables that are not secured in the hospital safe. However, if your insurance requires a co-pay, you may want to bring a method of payment, i.e. Check or credit card, if you wish to pay your co-pay the day of surgery. 12. If you are to stay overnight, you may bring a bag with personal items.  Please have any large items you may need brought in by your family after your arrival to your hospital room.    13. If you have a Living Will or Durable Power of , please bring a copy on the day of your procedure. 15. With your permission, one family member may accompany you while you are being prepared for surgery. Once you are ready, additional family members may join you. HOW WE KEEP YOU SAFE and WORK TO PREVENT SURGICAL SITE INFECTIONS:  1. Health care workers should always check your ID bracelet to verify your name and birth date. You will be asked many times to state your name, date of birth, and allergies. 2. Health care workers should always clean their hands with soap or alcohol gel before providing care to you. It is okay to ask anyone if they cleaned their hands before they touch you. 3. You will be actively involved in verifying the type of procedure you are having and ensuring the correct surgical site. This will be confirmed multiple times prior to your procedure. Do NOT brian your surgery site UNLESS instructed to by your surgeon. 4. Do not shave or wax for 72 hours prior to procedure near your operative site. Shaving with a razor can irritate your skin and make it easier to develop an infection. On the day of your procedure, any hair that needs to be removed near the surgical site will be clipped by a healthcare worker using a special clippers designed to avoid skin irritation. 5. When you are in the operating room, your surgical site will be cleansed with a special soap, and in most cases, you will be given an antibiotic before the surgery begins. What to expect AFTER YOUR PROCEDURE:  1. Immediately following your procedure, your will be taken to the PACU for the first phase of your recovery. Your nurse will help you recover from any potential side effects of anesthesia, such as extreme drowsiness, changes in your vital signs or breathing patterns. Nausea, headache, muscle aches, or sore throat may also occur after anesthesia.   Your nurse will help you manage these potential side effects. 2. For comfort and safety, arrange to have someone at home with you for the first 24 hours after discharge. 3. You and your family will be given written instructions about your diet, activity, dressing care, medications, and return visits. 4. Once at home, should issues with nausea, pain, or bleeding occur, or should you notice any signs of infection, you should call your surgeon. 5. Always clean your hands before and after caring for your wound. Do not let your family touch your surgery site without cleaning their hands. 6. Narcotic pain medications can cause significant constipation. You may want to add a stool softener to your postoperative medication schedule or speak to your surgeon on how best to manage this SIDE EFFECT. SPECIAL INSTRUCTIONS     Thank you for allowing us to care for you. We strive to exceed your expectations in the delivery of care and service provided to you and your family. If you need to contact us for any reason, please call us at [phone]    Instructions reviewed with patient during preadmission testing phone interview. Lauren Brown. 11/13/2020 .4:03 PM      ADDITIONAL EDUCATIONAL INFORMATION REVIEWED PER PHONE WITH YOU AND/OR YOUR FAMILY:  No Bring a urine sample on day of surgery  No Hibiclens® Bathing Instructions   Yes Antibacterial Soap

## 2020-11-16 ENCOUNTER — ANESTHESIA EVENT (OUTPATIENT)
Dept: OPERATING ROOM | Age: 70
End: 2020-11-16
Payer: MEDICARE

## 2020-11-16 PROBLEM — D05.11 DUCTAL CARCINOMA IN SITU (DCIS) OF RIGHT BREAST: Status: ACTIVE | Noted: 2020-11-16

## 2020-11-17 ENCOUNTER — ANESTHESIA (OUTPATIENT)
Dept: OPERATING ROOM | Age: 70
End: 2020-11-17
Payer: MEDICARE

## 2020-11-17 ENCOUNTER — HOSPITAL ENCOUNTER (OUTPATIENT)
Dept: MAMMOGRAPHY | Age: 70
Discharge: HOME | End: 2020-11-17
Payer: MEDICARE

## 2020-11-17 ENCOUNTER — HOSPITAL ENCOUNTER (OUTPATIENT)
Age: 70
Setting detail: OUTPATIENT SURGERY
Discharge: HOME | End: 2020-11-17
Attending: SURGERY | Admitting: SURGERY
Payer: MEDICARE

## 2020-11-17 VITALS
SYSTOLIC BLOOD PRESSURE: 132 MMHG | WEIGHT: 259 LBS | HEART RATE: 75 BPM | OXYGEN SATURATION: 95 % | HEIGHT: 63 IN | TEMPERATURE: 96.9 F | RESPIRATION RATE: 16 BRPM | BODY MASS INDEX: 45.89 KG/M2 | DIASTOLIC BLOOD PRESSURE: 76 MMHG

## 2020-11-17 VITALS
DIASTOLIC BLOOD PRESSURE: 60 MMHG | SYSTOLIC BLOOD PRESSURE: 101 MMHG | RESPIRATION RATE: 6 BRPM | OXYGEN SATURATION: 97 %

## 2020-11-17 PROCEDURE — 2720000010 HC SURG SUPPLY STERILE: Performed by: SURGERY

## 2020-11-17 PROCEDURE — 3600000004 HC SURGERY LEVEL 4 BASE: Performed by: SURGERY

## 2020-11-17 PROCEDURE — 7100000010 HC PHASE II RECOVERY - FIRST 15 MIN: Performed by: SURGERY

## 2020-11-17 PROCEDURE — 7100000000 HC PACU RECOVERY - FIRST 15 MIN: Performed by: SURGERY

## 2020-11-17 PROCEDURE — A4648 IMPLANTABLE TISSUE MARKER: HCPCS | Performed by: SURGERY

## 2020-11-17 PROCEDURE — 3700000001 HC ADD 15 MINUTES (ANESTHESIA): Performed by: SURGERY

## 2020-11-17 PROCEDURE — 88305 TISSUE EXAM BY PATHOLOGIST: CPT

## 2020-11-17 PROCEDURE — 88307 TISSUE EXAM BY PATHOLOGIST: CPT

## 2020-11-17 PROCEDURE — 6360000002 HC RX W HCPCS: Performed by: ANESTHESIOLOGY

## 2020-11-17 PROCEDURE — 7100000001 HC PACU RECOVERY - ADDTL 15 MIN: Performed by: SURGERY

## 2020-11-17 PROCEDURE — 2500000003 HC RX 250 WO HCPCS: Performed by: SURGERY

## 2020-11-17 PROCEDURE — 2709999900 HC NON-CHARGEABLE SUPPLY: Performed by: SURGERY

## 2020-11-17 PROCEDURE — 3600000014 HC SURGERY LEVEL 4 ADDTL 15MIN: Performed by: SURGERY

## 2020-11-17 PROCEDURE — 2580000003 HC RX 258: Performed by: ANESTHESIOLOGY

## 2020-11-17 PROCEDURE — 6370000000 HC RX 637 (ALT 250 FOR IP): Performed by: SURGERY

## 2020-11-17 PROCEDURE — 6360000002 HC RX W HCPCS: Performed by: NURSE ANESTHETIST, CERTIFIED REGISTERED

## 2020-11-17 PROCEDURE — 2500000003 HC RX 250 WO HCPCS: Performed by: NURSE ANESTHETIST, CERTIFIED REGISTERED

## 2020-11-17 PROCEDURE — 3700000000 HC ANESTHESIA ATTENDED CARE: Performed by: SURGERY

## 2020-11-17 PROCEDURE — 76098 X-RAY EXAM SURGICAL SPECIMEN: CPT

## 2020-11-17 PROCEDURE — 88360 TUMOR IMMUNOHISTOCHEM/MANUAL: CPT

## 2020-11-17 PROCEDURE — 2580000003 HC RX 258: Performed by: SURGERY

## 2020-11-17 PROCEDURE — 7100000011 HC PHASE II RECOVERY - ADDTL 15 MIN: Performed by: SURGERY

## 2020-11-17 RX ORDER — FENTANYL CITRATE 50 UG/ML
INJECTION, SOLUTION INTRAMUSCULAR; INTRAVENOUS PRN
Status: DISCONTINUED | OUTPATIENT
Start: 2020-11-17 | End: 2020-11-17 | Stop reason: SDUPTHER

## 2020-11-17 RX ORDER — LIDOCAINE HYDROCHLORIDE 10 MG/ML
1 INJECTION, SOLUTION EPIDURAL; INFILTRATION; INTRACAUDAL; PERINEURAL
Status: DISCONTINUED | OUTPATIENT
Start: 2020-11-17 | End: 2020-11-17 | Stop reason: HOSPADM

## 2020-11-17 RX ORDER — MIDAZOLAM HYDROCHLORIDE 1 MG/ML
INJECTION INTRAMUSCULAR; INTRAVENOUS PRN
Status: DISCONTINUED | OUTPATIENT
Start: 2020-11-17 | End: 2020-11-17 | Stop reason: SDUPTHER

## 2020-11-17 RX ORDER — SODIUM CHLORIDE 0.9 % (FLUSH) 0.9 %
10 SYRINGE (ML) INJECTION PRN
Status: DISCONTINUED | OUTPATIENT
Start: 2020-11-17 | End: 2020-11-17 | Stop reason: HOSPADM

## 2020-11-17 RX ORDER — ENALAPRILAT 2.5 MG/2ML
1.25 INJECTION INTRAVENOUS
Status: DISCONTINUED | OUTPATIENT
Start: 2020-11-17 | End: 2020-11-17 | Stop reason: HOSPADM

## 2020-11-17 RX ORDER — LIDOCAINE HYDROCHLORIDE 20 MG/ML
INJECTION, SOLUTION EPIDURAL; INFILTRATION; INTRACAUDAL; PERINEURAL PRN
Status: DISCONTINUED | OUTPATIENT
Start: 2020-11-17 | End: 2020-11-17 | Stop reason: SDUPTHER

## 2020-11-17 RX ORDER — SODIUM CHLORIDE, SODIUM LACTATE, POTASSIUM CHLORIDE, CALCIUM CHLORIDE 600; 310; 30; 20 MG/100ML; MG/100ML; MG/100ML; MG/100ML
INJECTION, SOLUTION INTRAVENOUS CONTINUOUS
Status: DISCONTINUED | OUTPATIENT
Start: 2020-11-17 | End: 2020-11-17 | Stop reason: HOSPADM

## 2020-11-17 RX ORDER — PROPOFOL 10 MG/ML
INJECTION, EMULSION INTRAVENOUS CONTINUOUS PRN
Status: DISCONTINUED | OUTPATIENT
Start: 2020-11-17 | End: 2020-11-17 | Stop reason: SDUPTHER

## 2020-11-17 RX ORDER — MAGNESIUM HYDROXIDE 1200 MG/15ML
LIQUID ORAL CONTINUOUS PRN
Status: COMPLETED | OUTPATIENT
Start: 2020-11-17 | End: 2020-11-17

## 2020-11-17 RX ORDER — HYDROCODONE BITARTRATE AND ACETAMINOPHEN 5; 325 MG/1; MG/1
1 TABLET ORAL
Status: COMPLETED | OUTPATIENT
Start: 2020-11-17 | End: 2020-11-17

## 2020-11-17 RX ORDER — ONDANSETRON 2 MG/ML
4 INJECTION INTRAMUSCULAR; INTRAVENOUS
Status: DISCONTINUED | OUTPATIENT
Start: 2020-11-17 | End: 2020-11-17 | Stop reason: HOSPADM

## 2020-11-17 RX ORDER — FENTANYL CITRATE 50 UG/ML
50 INJECTION, SOLUTION INTRAMUSCULAR; INTRAVENOUS EVERY 5 MIN PRN
Status: DISCONTINUED | OUTPATIENT
Start: 2020-11-17 | End: 2020-11-17 | Stop reason: HOSPADM

## 2020-11-17 RX ORDER — HYDROCODONE BITARTRATE AND ACETAMINOPHEN 5; 325 MG/1; MG/1
1 TABLET ORAL EVERY 6 HOURS PRN
Qty: 10 TABLET | Refills: 0 | Status: SHIPPED | OUTPATIENT
Start: 2020-11-17 | End: 2020-11-20

## 2020-11-17 RX ORDER — HYDRALAZINE HYDROCHLORIDE 20 MG/ML
5 INJECTION INTRAMUSCULAR; INTRAVENOUS EVERY 5 MIN PRN
Status: DISCONTINUED | OUTPATIENT
Start: 2020-11-17 | End: 2020-11-17 | Stop reason: HOSPADM

## 2020-11-17 RX ORDER — SODIUM CHLORIDE 0.9 % (FLUSH) 0.9 %
10 SYRINGE (ML) INJECTION EVERY 12 HOURS SCHEDULED
Status: DISCONTINUED | OUTPATIENT
Start: 2020-11-17 | End: 2020-11-17 | Stop reason: HOSPADM

## 2020-11-17 RX ORDER — LABETALOL 20 MG/4 ML (5 MG/ML) INTRAVENOUS SYRINGE
5 EVERY 10 MIN PRN
Status: DISCONTINUED | OUTPATIENT
Start: 2020-11-17 | End: 2020-11-17 | Stop reason: HOSPADM

## 2020-11-17 RX ORDER — FENTANYL CITRATE 50 UG/ML
25 INJECTION, SOLUTION INTRAMUSCULAR; INTRAVENOUS EVERY 5 MIN PRN
Status: DISCONTINUED | OUTPATIENT
Start: 2020-11-17 | End: 2020-11-17 | Stop reason: HOSPADM

## 2020-11-17 RX ORDER — PROPOFOL 10 MG/ML
INJECTION, EMULSION INTRAVENOUS PRN
Status: DISCONTINUED | OUTPATIENT
Start: 2020-11-17 | End: 2020-11-17 | Stop reason: SDUPTHER

## 2020-11-17 RX ADMIN — FENTANYL CITRATE 50 MCG: 50 INJECTION INTRAMUSCULAR; INTRAVENOUS at 11:44

## 2020-11-17 RX ADMIN — HYDROMORPHONE HYDROCHLORIDE 0.25 MG: 1 INJECTION, SOLUTION INTRAMUSCULAR; INTRAVENOUS; SUBCUTANEOUS at 13:18

## 2020-11-17 RX ADMIN — HYDROCODONE BITARTRATE AND ACETAMINOPHEN 1 TABLET: 5; 325 TABLET ORAL at 13:44

## 2020-11-17 RX ADMIN — MIDAZOLAM HYDROCHLORIDE 2 MG: 2 INJECTION, SOLUTION INTRAMUSCULAR; INTRAVENOUS at 11:41

## 2020-11-17 RX ADMIN — PROPOFOL 60 MG: 10 INJECTION, EMULSION INTRAVENOUS at 11:45

## 2020-11-17 RX ADMIN — FENTANYL CITRATE 50 MCG: 50 INJECTION INTRAMUSCULAR; INTRAVENOUS at 12:41

## 2020-11-17 RX ADMIN — LIDOCAINE HYDROCHLORIDE 60 MG: 20 INJECTION, SOLUTION EPIDURAL; INFILTRATION; INTRACAUDAL; PERINEURAL at 11:44

## 2020-11-17 RX ADMIN — SODIUM CHLORIDE, POTASSIUM CHLORIDE, SODIUM LACTATE AND CALCIUM CHLORIDE: 600; 310; 30; 20 INJECTION, SOLUTION INTRAVENOUS at 10:19

## 2020-11-17 RX ADMIN — PROPOFOL 100 MCG/KG/MIN: 10 INJECTION, EMULSION INTRAVENOUS at 11:49

## 2020-11-17 ASSESSMENT — PULMONARY FUNCTION TESTS
PIF_VALUE: 1
PIF_VALUE: 0
PIF_VALUE: 1
PIF_VALUE: 0
PIF_VALUE: 1
PIF_VALUE: 0
PIF_VALUE: 1
PIF_VALUE: 0
PIF_VALUE: 1

## 2020-11-17 ASSESSMENT — PAIN SCALES - GENERAL
PAINLEVEL_OUTOF10: 4
PAINLEVEL_OUTOF10: 5
PAINLEVEL_OUTOF10: 5

## 2020-11-17 ASSESSMENT — PAIN DESCRIPTION - PAIN TYPE: TYPE: SURGICAL PAIN

## 2020-11-17 ASSESSMENT — PAIN DESCRIPTION - LOCATION: LOCATION: BREAST

## 2020-11-17 ASSESSMENT — PAIN DESCRIPTION - ORIENTATION: ORIENTATION: RIGHT

## 2020-11-17 ASSESSMENT — PAIN - FUNCTIONAL ASSESSMENT: PAIN_FUNCTIONAL_ASSESSMENT: 0-10

## 2020-11-17 NOTE — PROGRESS NOTES
Ambulatory Surgery/Procedure Discharge Note    Vitals:    11/17/20 1405   BP: 132/76   Pulse: 75   Resp: 16   Temp: 96.9 °F (36.1 °C)   SpO2: 95%       In: 800 [I.V.:800]  Out: -     Restroom use offered before discharge. Yes,pt declined     Pain assessment:  level of pain (1-10, 10 severe)  Pain Level: 5    Pt to SDS post right breast lumpectomy, placement of biozorb marker, mammogram laterality, 1 tag, target calcifications ( clip if no residual calcifications). Surgical glue clean, dry and intact, no hardness or warmth to site. Pt states surgical pain 5/10 at this time, pt medicated per [facility] per PACU RN. Pt denies nausea at this time . Discharge instructions and written prescriptions given to pt's  and he states understanding of these instructions. Pt states that she is \" ready for discharge\". Patient discharged to home/self care.  Patient discharged via wheel chair by transporter to waiting family/S.O.       11/17/2020 2:32 PM

## 2020-11-17 NOTE — PROGRESS NOTES
PACU Transfer to \[facility]\""    Vitals:    11/17/20 1345   BP: 132/81   Pulse: 67   Resp: 14   Temp: 97.5   SpO2: 93%         Intake/Output Summary (Last 24 hours) at 11/17/2020 1349  Last data filed at 11/17/2020 1240  Gross per 24 hour   Intake 800 ml   Output --   Net 800 ml       Pain assessment:  present - adequately treated  Pain Level: 4    Patient transferred to care of \[facility]\"" RN.    11/17/2020 1:49 PM

## 2020-11-17 NOTE — ANESTHESIA PRE PROCEDURE
Department of Anesthesiology  Preprocedure Note       Name:  Will Vazquez   Age:  72 y.o.  :  1950                                          MRN:  [id number]         Date:  2020      Surgeon: Laney Khan):  Jerardo Alicia MD    Procedure: Procedure(s):  RIGHT BREAST LUMPECTOMY, PLACEMENT OF BIOHAZARD MARKER, MAMMOGRAM LATERALITY, 1 TAG, TARGET CALCIFICATIONS (CLIP IF NO RESIDUAL CALCIFICATIONS)  (20) 10:00AM  .    Medications prior to admission:   Prior to Admission medications    Medication Sig Start Date End Date Taking?  Authorizing Provider   vitamin B-12 (CYANOCOBALAMIN) 100 MCG tablet Take 50 mcg by mouth daily   Yes Historical Provider, MD   albuterol sulfate HFA (VENTOLIN HFA) 108 (90 Base) MCG/ACT inhaler Inhale 2 puffs into the lungs every 6 hours as needed for Wheezing or Shortness of Breath 20  Yes Dwayne Arias MD   VITAMIN D PO Take by mouth   Yes Historical Provider, MD   potassium chloride (KLOR-CON) 10 MEQ extended release tablet Take 10 mEq by mouth  19  Yes Historical Provider, MD       Current medications:    Current Facility-Administered Medications   Medication Dose Route Frequency Provider Last Rate Last Dose    lactated ringers infusion   Intravenous Continuous Floyd DO Joshua        lactated ringers infusion   Intravenous Continuous Gavin Lord MD        sodium chloride flush 0.9 % injection 10 mL  10 mL Intravenous 2 times per day Gavin Lord MD        sodium chloride flush 0.9 % injection 10 mL  10 mL Intravenous PRN Gavin Lord MD        lidocaine PF 1 % injection 1 mL  1 mL Intradermal Once PRN Gavin Lord MD           Allergies:  No Known Allergies    Problem List:    Patient Active Problem List   Diagnosis Code    Hypercholesterolemia E78.00    Malignant tumor of breast ([address] C50.919    Nodule of upper lobe of right lung R91.1    Adenocarcinoma, lung, right ([address] C34.91    S/P thoracotomy Z98.890    Difficult intravenous access Z78.9    Acute postoperative pain G89.18    Ductal carcinoma in situ (DCIS) of left breast D05.12    Breast neoplasm, Tis (DCIS), right D05.11    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       Past Medical History:        Diagnosis Date    Adenocarcinoma, lung, right ([facility] 2019    RML    Anesthesia     per radiologist that did breast biopsy, required 3x normal amount of numbing medication     Breast cancer ([facility] 2008    radiation and lumpectomy-LEFT    COPD (chronic obstructive pulmonary disease) ([facility]     Difficult intravenous access     Hypercholesteremia     Mass of right lung 2019    Nodule of upper lobe of right lung     PONV (postoperative nausea and vomiting)     S/P thoracotomy 2019    RML removed       Past Surgical History:        Procedure Laterality Date    BREAST LUMPECTOMY Left     ENDOMETRIAL BIOPSY  2008    KIDNEY SURGERY Left     ablation    OVARY REMOVAL Bilateral 2009    for multiloculated serous cystadenoma    REVISION TOTAL KNEE ARTHROPLASTY Right 2016    THORACOSCOPY Right 2019    Dr. Farrukh Erazo - video assisted w/removal of RML, MSLND    THYROIDECTOMY      PARTIAL    TOTAL KNEE ARTHROPLASTY Right 10/2014    TUMOR EXCISION      from head       Social History:    Social History     Tobacco Use    Smoking status: Former Smoker     Packs/day: 1.00     Years: 20.00     Pack years: 20.00     Types: Cigarettes     Last attempt to quit: 2015     Years since quittin.6    Smokeless tobacco: Never Used   Substance Use Topics    Alcohol use: Yes     Comment: VERY RARE                                Counseling given: Not Answered      Vital Signs (Current):   Vitals:    20 1559   Weight: 259 lb (117.5 kg)   Height: 5' 3\" (1.6 m)                                              BP Readings from Last 3 Encounters:   10/01/20 (!) 158/91   20 124/76   19 138/78       NPO Status: BMI:   Wt Readings from Last 3 Encounters:   11/13/20 259 lb (117.5 kg)   10/01/20 259 lb (117.5 kg)   04/22/20 250 lb (113.4 kg)     Body mass index is 45.88 kg/m². CBC:   Lab Results   Component Value Date    WBC 7.1 07/10/2019    RBC 4.96 07/10/2019    HGB 15.1 07/10/2019    HCT 45.5 07/10/2019    MCV 91.7 07/10/2019    RDW 13.9 07/10/2019     07/10/2019       CMP:   Lab Results   Component Value Date     07/24/2019    K 4.9 07/24/2019     07/24/2019    CO2 23 07/24/2019    BUN 14 07/24/2019    CREATININE 0.8 07/24/2019    GFRAA >60 07/24/2019    AGRATIO 0.9 07/24/2019    LABGLOM >60 07/24/2019    GLUCOSE 136 07/24/2019    PROT 6.3 07/24/2019    CALCIUM 8.0 07/24/2019    BILITOT <0.2 07/24/2019    ALKPHOS 84 07/24/2019    AST 31 07/24/2019    ALT 35 07/24/2019       POC Tests: No results for input(s): POCGLU, POCNA, POCK, POCCL, POCBUN, POCHEMO, POCHCT in the last 72 hours.     Coags:   Lab Results   Component Value Date    PROTIME 11.1 07/10/2019    INR 0.97 07/10/2019    APTT 35.7 07/10/2019       HCG (If Applicable): No results found for: PREGTESTUR, PREGSERUM, HCG, HCGQUANT     ABGs: No results found for: PHART, PO2ART, FEX9RAE, HJL3GBA, BEART, E6TZTWRU     Type & Screen (If Applicable):  No results found for: LABABO, LABRH    Drug/Infectious Status (If Applicable):  No results found for: HIV, HEPCAB    COVID-19 Screening (If Applicable):   Lab Results   Component Value Date    COVID19 Not Detected 11/11/2020         Anesthesia Evaluation  Patient summary reviewed history of anesthetic complications:   Airway: Mallampati: III  TM distance: >3 FB   Neck ROM: full  Mouth opening: > = 3 FB Dental: normal exam         Pulmonary:   (+) COPD:                             Cardiovascular:Negative CV ROS                      Neuro/Psych:               GI/Hepatic/Renal:             Endo/Other:                      ROS comment: Kidney CA  10

## 2020-11-17 NOTE — H&P
Full history and physical exam performed within the last 24 hours. Patient states no interval change since H&P completed.     Gustave Lennox, [place] 11/17/2020

## 2020-11-17 NOTE — PROGRESS NOTES
Patient admitted to pacu post RIGHT BREAST LUMPECTOMY, PLACEMENT OF BIOZORB MARKER, MAMMOGRAM LATERALITY, 1 TAG, TARGET CALCIFICATIONS (CLIP IF NO RESIDUAL CALCIFICATIONS) (11-11-20) 10:00AM - Right with Dr. Shae Mckeon. Patient connected to bedside monitors; VSS. Per CRNA patient was stable intraop. Patient resting comfortably with no complaints of pain.

## 2020-11-18 NOTE — OP NOTE
confirmed the presence of both the  radiofrequency tag and the previous surgical clip within the specimen. Both specimens were then placed in formalin for permanent section. We  placed a 2 x 2 cm BioZorb marker into the cavity and secured it with 2-0  silk sutures. The incision was then closed with interrupted  subcutaneous sutures, 3-0 Vicryl subcutaneous sutures, and a 4-0  Monocryl subcuticular skin closure. This incision was sealed with  Dermabond. The patient was taken to recovery in satisfactory condition  having tolerated the procedure well. BioZorb placed was lot number  [id number], reference number .         Nancy Roberto MD    D: 11/18/2020 8:05:44       T: 11/18/2020 8:13:24     JORGE [email or web address]  Job#: [id number]     Doc#: [id number]    CC:

## 2020-11-23 NOTE — PROGRESS NOTES
[facility] / [facility] [address] [facility], [address]    Acknowledgment of Informed Consent for Surgical or Medical Procedure and Sedation  I agree to allow doctor(s) LELAND LOPEZ and his/her associates or assistants, including residents and/or other qualified medical practitioner to perform the following medical treatment or procedure and to administer or direct the administration of sedation as necessary:  Procedure(s): RIGHT BREAST SENTINEL NODE BIOPSY  My doctor has explained the following regarding the proposed procedure:   the explanation of the procedure   the benefits of the procedure   the potential problems that might occur during recuperation   the risks and side effects of the procedure which could include but are not limited to severe blood loss, infection, stroke or death   the benefits, risks and side effect of alternative procedures including the consequences of declining this procedure or any alternative procedures   the likelihood of achieving satisfactory results. I acknowledge no guarantee or assurance has been made to me regarding the results. I understand that during the course of this treatment/procedure, unforeseen conditions can occur which require an additional or different procedure. I agree to allow my physician or assistants to perform such extension of the original procedure as they may find necessary. I understand that sedation will often result in temporary impairment of memory and fine motor skills and that sedation can occasionally progress to a state of deep sedation or general anesthesia. I understand the risks of anesthesia for surgery include, but are not limited to, sore throat, hoarseness, injury to face, mouth, or teeth; nausea; headache; injury to blood vessels or nerves; death, brain damage, or paralysis.     I understand that if I have a Limitation of Treatment order in effect during my hospitalization, the order may or may not be in effect during this procedure. I give my doctor permission to give me blood or blood products. I understand that there are risks with receiving blood such as hepatitis, AIDS, fever, or allergic reaction. I acknowledge that the risks, benefits, and alternatives of this treatment have been explained to me and that no express or implied warranty has been given by the hospital, any blood bank, or any person or entity as to the blood or blood components transfused. At the discretion of my doctor, I agree to allow observers, equipment/product representatives and allow photographing, and/or televising of the procedure, provided my name or identity is maintained confidentially. I agree the hospital may dispose of or use for scientific or educational purposes any tissue, fluid, or body parts which may be removed.     ________________________________Date________Time______ am/pm  ([place] [facility])  Patient or Signature of Closest Relative or Legal Guardian    ________________________________Date________Time______am/pm      Page 1 of  1  Witness

## 2020-11-25 ENCOUNTER — OFFICE VISIT (OUTPATIENT)
Dept: PRIMARY CARE CLINIC | Age: 70
End: 2020-11-25
Payer: MEDICARE

## 2020-11-25 PROCEDURE — G8428 CUR MEDS NOT DOCUMENT: HCPCS | Performed by: NURSE PRACTITIONER

## 2020-11-25 PROCEDURE — 99211 OFF/OP EST MAY X REQ PHY/QHP: CPT | Performed by: NURSE PRACTITIONER

## 2020-11-25 PROCEDURE — G8417 CALC BMI ABV UP PARAM F/U: HCPCS | Performed by: NURSE PRACTITIONER

## 2020-11-25 NOTE — PROGRESS NOTES
Patient reminded to remain in quarantine after her covid testing until surgery, she verbalizes undersanding.

## 2020-11-25 NOTE — PROGRESS NOTES
[facility] patients having surgery or anesthesia are required to be Covid tested. You will need to quarantined from the time you are tested until your surgery. PRIOR TO PROCEDURE DATE:  1. Please follow any guidelines/instructions prior to your procedure as advised by your surgeon. 2. Arrange for someone to drive you home and be with you for the first 24 hours after discharge for your safety after your procedure for which you received sedation. Ensure it is someone we can share information with regarding your discharge. 3. You must contact your surgeon for instructions IF:   You are taking any blood thinners, aspirin, anti-inflammatory or vitamin E.   There is a change in your physical condition such as a cold, fever, rash, cuts, sores or any other infection, especially near your surgical site. 4. Do not drink alcohol the day before or day of your procedure. 5. A Pre-op History and Physical for surgery MUST be completed by your Physician or Urgent Care within 30 days of your procedure date. Please bring a copy with you on the day of your procedure and along with any other testing performed. THE DAY OF YOUR PROCEDURE:  1. Follow instructions for ARRIVAL TIME as DIRECTED BY YOUR SURGEON. I    2. Enter the MAIN entrance from [employer] and follow the signs to the free [employer] or [email or web address] parking (offered free of charge 6am-5pm). 3. Enter the Main Entrance of the hospital (do not enter from the lower level of the parking garage). Upon entrance, check in with the  at the main desk on your left. If no one is available at the desk, proceed into the [facility] Waiting Room and go through the door directly into the [facility]. There is a Check-in desk ACROSS from Room 5 (marked with a sign hanging from the ceiling). The phone number for the surgery center is [phone].     4. Please call [phone] option #2 option #2 if you have not been preregistered yet. On the day of your procedure bring your insurance card and photo ID. You will be registered at your bedside once brought back to your room. 5. DO NOT EAT ANYTHING eight hours prior to surgery. May have 8 ounces of water 4 hours prior to surgery. 6. MEDICATIONS    Take the following medications with a SMALL sip of water: bring in albuterol inhaler   Use your usual dose of inhalers the morning of surgery. BRING your rescue inhaler with you to hospital.    Anesthesia does NOT want you to take insulin the morning of surgery. They will control your blood sugar while you are at the hospital. Please contact your ordering physician for instructions regarding your insulin the night before your procedure. If you have an insulin pump, please keep it set on basal rate. 7. Do not swallow water when brushing teeth. No gum, candy, mints or ice chips. Refrain from smoking or at least decrease the amount. 8. Dress in loose, comfortable clothing appropriate for redressing after your procedure. Do not wear jewelry (including body piercings), make-up (especially NO eye make-up), fingernail polish (NO toenail polish if foot/leg surgery), lotion, powders or metal hairclips. 9. Dentures, glasses, or contacts will need to be removed before your procedure. Bring cases for your glasses, contacts, dentures, or hearing aids to protect them while you are in surgery. 10. If you use a CPAP, please bring it with you on the day of your procedure. 11. We recommend that valuable personal  belongings such as cash, cell phones, e-tablets or jewelry, be left at home during your stay. The hospital will not be responsible for valuables that are not secured in the hospital safe. However, if your insurance requires a co-pay, you may want to bring a method of payment, i.e. Check or credit card, if you wish to pay your co-pay the day of surgery. drowsiness, changes in your vital signs or breathing patterns. Nausea, headache, muscle aches, or sore throat may also occur after anesthesia. Your nurse will help you manage these potential side effects. 2. For comfort and safety, arrange to have someone at home with you for the first 24 hours after discharge. 3. You and your family will be given written instructions about your diet, activity, dressing care, medications, and return visits. 4. Once at home, should issues with nausea, pain, or bleeding occur, or should you notice any signs of infection, you should call your surgeon. 5. Always clean your hands before and after caring for your wound. Do not let your family touch your surgery site without cleaning their hands. 6. Narcotic pain medications can cause significant constipation. You may want to add a stool softener to your postoperative medication schedule or speak to your surgeon on how best to manage this SIDE EFFECT. SPECIAL INSTRUCTIONS reviewed visitor restrictions that are in place as of today. Patient acknowledges understanding of all instructions that were reviewed. Thank you for allowing us to care for you. We strive to exceed your expectations in the delivery of care and service provided to you and your family. If you need to contact us for any reason, please call us at [phone]    Instructions reviewed with patient during preadmission testing phone interview. Abigail Schwartz. 11/25/2020 .3:26 PM      ADDITIONAL EDUCATIONAL INFORMATION REVIEWED PER PHONE WITH YOU AND/OR YOUR FAMILY:  No Bring a urine sample on day of surgery  No Hibiclens® Bathing Instructions   Yes Antibacterial Soap

## 2020-11-26 LAB — SARS-COV-2: NOT DETECTED

## 2020-11-30 ENCOUNTER — ANESTHESIA EVENT (OUTPATIENT)
Dept: OPERATING ROOM | Age: 70
End: 2020-11-30
Payer: MEDICARE

## 2020-12-01 ENCOUNTER — HOSPITAL ENCOUNTER (OUTPATIENT)
Dept: NUCLEAR MEDICINE | Age: 70
Discharge: HOME | End: 2020-12-01
Payer: MEDICARE

## 2020-12-01 ENCOUNTER — ANESTHESIA (OUTPATIENT)
Dept: OPERATING ROOM | Age: 70
End: 2020-12-01
Payer: MEDICARE

## 2020-12-01 ENCOUNTER — HOSPITAL ENCOUNTER (OUTPATIENT)
Age: 70
Setting detail: OUTPATIENT SURGERY
Discharge: HOME | End: 2020-12-01
Attending: SURGERY | Admitting: SURGERY
Payer: MEDICARE

## 2020-12-01 VITALS
RESPIRATION RATE: 18 BRPM | DIASTOLIC BLOOD PRESSURE: 60 MMHG | WEIGHT: 245 LBS | BODY MASS INDEX: 43.41 KG/M2 | TEMPERATURE: 97.3 F | HEART RATE: 77 BPM | OXYGEN SATURATION: 96 % | SYSTOLIC BLOOD PRESSURE: 125 MMHG | HEIGHT: 63 IN

## 2020-12-01 VITALS — SYSTOLIC BLOOD PRESSURE: 104 MMHG | OXYGEN SATURATION: 92 % | TEMPERATURE: 97.2 F | DIASTOLIC BLOOD PRESSURE: 69 MMHG

## 2020-12-01 PROCEDURE — 7100000011 HC PHASE II RECOVERY - ADDTL 15 MIN: Performed by: SURGERY

## 2020-12-01 PROCEDURE — 6360000002 HC RX W HCPCS: Performed by: NURSE ANESTHETIST, CERTIFIED REGISTERED

## 2020-12-01 PROCEDURE — 88305 TISSUE EXAM BY PATHOLOGIST: CPT

## 2020-12-01 PROCEDURE — 2709999900 HC NON-CHARGEABLE SUPPLY: Performed by: SURGERY

## 2020-12-01 PROCEDURE — 7100000000 HC PACU RECOVERY - FIRST 15 MIN: Performed by: SURGERY

## 2020-12-01 PROCEDURE — 3700000000 HC ANESTHESIA ATTENDED CARE: Performed by: SURGERY

## 2020-12-01 PROCEDURE — 88342 IMHCHEM/IMCYTCHM 1ST ANTB: CPT

## 2020-12-01 PROCEDURE — 3600000004 HC SURGERY LEVEL 4 BASE: Performed by: SURGERY

## 2020-12-01 PROCEDURE — A9541 TC99M SULFUR COLLOID: HCPCS | Performed by: SURGERY

## 2020-12-01 PROCEDURE — 6360000002 HC RX W HCPCS: Performed by: ANESTHESIOLOGY

## 2020-12-01 PROCEDURE — 3700000001 HC ADD 15 MINUTES (ANESTHESIA): Performed by: SURGERY

## 2020-12-01 PROCEDURE — 2500000003 HC RX 250 WO HCPCS: Performed by: SURGERY

## 2020-12-01 PROCEDURE — 3430000000 HC RX DIAGNOSTIC RADIOPHARMACEUTICAL: Performed by: SURGERY

## 2020-12-01 PROCEDURE — 3600000014 HC SURGERY LEVEL 4 ADDTL 15MIN: Performed by: SURGERY

## 2020-12-01 PROCEDURE — 2580000003 HC RX 258: Performed by: ANESTHESIOLOGY

## 2020-12-01 PROCEDURE — 88307 TISSUE EXAM BY PATHOLOGIST: CPT

## 2020-12-01 PROCEDURE — 88331 PATH CONSLTJ SURG 1 BLK 1SPC: CPT

## 2020-12-01 PROCEDURE — 2580000003 HC RX 258: Performed by: SURGERY

## 2020-12-01 PROCEDURE — 6360000002 HC RX W HCPCS: Performed by: SURGERY

## 2020-12-01 PROCEDURE — 6370000000 HC RX 637 (ALT 250 FOR IP): Performed by: SURGERY

## 2020-12-01 PROCEDURE — 7100000010 HC PHASE II RECOVERY - FIRST 15 MIN: Performed by: SURGERY

## 2020-12-01 PROCEDURE — 7100000001 HC PACU RECOVERY - ADDTL 15 MIN: Performed by: SURGERY

## 2020-12-01 PROCEDURE — 38792 RA TRACER ID OF SENTINL NODE: CPT

## 2020-12-01 PROCEDURE — 2500000003 HC RX 250 WO HCPCS: Performed by: NURSE ANESTHETIST, CERTIFIED REGISTERED

## 2020-12-01 RX ORDER — LIDOCAINE HYDROCHLORIDE 20 MG/ML
INJECTION, SOLUTION INTRAVENOUS PRN
Status: DISCONTINUED | OUTPATIENT
Start: 2020-12-01 | End: 2020-12-01 | Stop reason: SDUPTHER

## 2020-12-01 RX ORDER — SODIUM CHLORIDE 0.9 % (FLUSH) 0.9 %
10 SYRINGE (ML) INJECTION EVERY 12 HOURS SCHEDULED
Status: DISCONTINUED | OUTPATIENT
Start: 2020-12-01 | End: 2020-12-01 | Stop reason: HOSPADM

## 2020-12-01 RX ORDER — ONDANSETRON 2 MG/ML
4 INJECTION INTRAMUSCULAR; INTRAVENOUS
Status: COMPLETED | OUTPATIENT
Start: 2020-12-01 | End: 2020-12-01

## 2020-12-01 RX ORDER — FENTANYL CITRATE 50 UG/ML
50 INJECTION, SOLUTION INTRAMUSCULAR; INTRAVENOUS EVERY 5 MIN PRN
Status: DISCONTINUED | OUTPATIENT
Start: 2020-12-01 | End: 2020-12-01 | Stop reason: HOSPADM

## 2020-12-01 RX ORDER — SODIUM CHLORIDE, SODIUM LACTATE, POTASSIUM CHLORIDE, CALCIUM CHLORIDE 600; 310; 30; 20 MG/100ML; MG/100ML; MG/100ML; MG/100ML
INJECTION, SOLUTION INTRAVENOUS CONTINUOUS
Status: DISCONTINUED | OUTPATIENT
Start: 2020-12-01 | End: 2020-12-01 | Stop reason: HOSPADM

## 2020-12-01 RX ORDER — ONDANSETRON 2 MG/ML
INJECTION INTRAMUSCULAR; INTRAVENOUS PRN
Status: DISCONTINUED | OUTPATIENT
Start: 2020-12-01 | End: 2020-12-01 | Stop reason: SDUPTHER

## 2020-12-01 RX ORDER — CEFAZOLIN SODIUM 2 G/50ML
2 SOLUTION INTRAVENOUS ONCE
Status: COMPLETED | OUTPATIENT
Start: 2020-12-01 | End: 2020-12-01

## 2020-12-01 RX ORDER — FENTANYL CITRATE 50 UG/ML
INJECTION, SOLUTION INTRAMUSCULAR; INTRAVENOUS PRN
Status: DISCONTINUED | OUTPATIENT
Start: 2020-12-01 | End: 2020-12-01 | Stop reason: SDUPTHER

## 2020-12-01 RX ORDER — LIDOCAINE HYDROCHLORIDE 10 MG/ML
1 INJECTION, SOLUTION EPIDURAL; INFILTRATION; INTRACAUDAL; PERINEURAL
Status: DISCONTINUED | OUTPATIENT
Start: 2020-12-01 | End: 2020-12-01 | Stop reason: HOSPADM

## 2020-12-01 RX ORDER — DEXAMETHASONE SODIUM PHOSPHATE 4 MG/ML
INJECTION, SOLUTION INTRA-ARTICULAR; INTRALESIONAL; INTRAMUSCULAR; INTRAVENOUS; SOFT TISSUE PRN
Status: DISCONTINUED | OUTPATIENT
Start: 2020-12-01 | End: 2020-12-01 | Stop reason: SDUPTHER

## 2020-12-01 RX ORDER — SODIUM CHLORIDE 0.9 % (FLUSH) 0.9 %
10 SYRINGE (ML) INJECTION PRN
Status: DISCONTINUED | OUTPATIENT
Start: 2020-12-01 | End: 2020-12-01 | Stop reason: HOSPADM

## 2020-12-01 RX ORDER — HYDROCODONE BITARTRATE AND ACETAMINOPHEN 5; 325 MG/1; MG/1
1 TABLET ORAL EVERY 6 HOURS PRN
Qty: 20 TABLET | Refills: 0 | Status: SHIPPED | OUTPATIENT
Start: 2020-12-01 | End: 2020-12-06

## 2020-12-01 RX ORDER — PROPOFOL 10 MG/ML
INJECTION, EMULSION INTRAVENOUS PRN
Status: DISCONTINUED | OUTPATIENT
Start: 2020-12-01 | End: 2020-12-01 | Stop reason: SDUPTHER

## 2020-12-01 RX ORDER — ROCURONIUM BROMIDE 10 MG/ML
INJECTION, SOLUTION INTRAVENOUS PRN
Status: DISCONTINUED | OUTPATIENT
Start: 2020-12-01 | End: 2020-12-01 | Stop reason: SDUPTHER

## 2020-12-01 RX ORDER — HYDRALAZINE HYDROCHLORIDE 20 MG/ML
5 INJECTION INTRAMUSCULAR; INTRAVENOUS EVERY 5 MIN PRN
Status: DISCONTINUED | OUTPATIENT
Start: 2020-12-01 | End: 2020-12-01 | Stop reason: HOSPADM

## 2020-12-01 RX ORDER — MIDAZOLAM HYDROCHLORIDE 1 MG/ML
INJECTION INTRAMUSCULAR; INTRAVENOUS PRN
Status: DISCONTINUED | OUTPATIENT
Start: 2020-12-01 | End: 2020-12-01 | Stop reason: SDUPTHER

## 2020-12-01 RX ORDER — LABETALOL HYDROCHLORIDE 5 MG/ML
5 INJECTION, SOLUTION INTRAVENOUS EVERY 10 MIN PRN
Status: DISCONTINUED | OUTPATIENT
Start: 2020-12-01 | End: 2020-12-01 | Stop reason: HOSPADM

## 2020-12-01 RX ORDER — HYDROCODONE BITARTRATE AND ACETAMINOPHEN 5; 325 MG/1; MG/1
1 TABLET ORAL EVERY 4 HOURS PRN
Status: DISCONTINUED | OUTPATIENT
Start: 2020-12-01 | End: 2020-12-01 | Stop reason: HOSPADM

## 2020-12-01 RX ORDER — ENALAPRILAT 2.5 MG/2ML
1.25 INJECTION INTRAVENOUS
Status: DISCONTINUED | OUTPATIENT
Start: 2020-12-01 | End: 2020-12-01 | Stop reason: HOSPADM

## 2020-12-01 RX ORDER — MAGNESIUM HYDROXIDE 1200 MG/15ML
LIQUID ORAL CONTINUOUS PRN
Status: COMPLETED | OUTPATIENT
Start: 2020-12-01 | End: 2020-12-01

## 2020-12-01 RX ORDER — ISOSULFAN BLUE 50 MG/5ML
INJECTION, SOLUTION SUBCUTANEOUS PRN
Status: DISCONTINUED | OUTPATIENT
Start: 2020-12-01 | End: 2020-12-01 | Stop reason: ALTCHOICE

## 2020-12-01 RX ORDER — FENTANYL CITRATE 50 UG/ML
25 INJECTION, SOLUTION INTRAMUSCULAR; INTRAVENOUS EVERY 5 MIN PRN
Status: DISCONTINUED | OUTPATIENT
Start: 2020-12-01 | End: 2020-12-01 | Stop reason: HOSPADM

## 2020-12-01 RX ADMIN — ONDANSETRON 4 MG: 2 INJECTION INTRAMUSCULAR; INTRAVENOUS at 13:58

## 2020-12-01 RX ADMIN — SODIUM CHLORIDE, SODIUM LACTATE, POTASSIUM CHLORIDE, AND CALCIUM CHLORIDE: 600; 310; 30; 20 INJECTION, SOLUTION INTRAVENOUS at 14:53

## 2020-12-01 RX ADMIN — PHENYLEPHRINE HYDROCHLORIDE 80 MCG: 10 INJECTION, SOLUTION INTRAMUSCULAR; INTRAVENOUS; SUBCUTANEOUS at 13:56

## 2020-12-01 RX ADMIN — MIDAZOLAM HYDROCHLORIDE 2 MG: 2 INJECTION, SOLUTION INTRAMUSCULAR; INTRAVENOUS at 13:33

## 2020-12-01 RX ADMIN — HYDROMORPHONE HYDROCHLORIDE 0.5 MG: 1 INJECTION, SOLUTION INTRAMUSCULAR; INTRAVENOUS; SUBCUTANEOUS at 15:26

## 2020-12-01 RX ADMIN — PHENYLEPHRINE HYDROCHLORIDE 80 MCG: 10 INJECTION, SOLUTION INTRAMUSCULAR; INTRAVENOUS; SUBCUTANEOUS at 13:50

## 2020-12-01 RX ADMIN — SODIUM CHLORIDE, SODIUM LACTATE, POTASSIUM CHLORIDE, AND CALCIUM CHLORIDE: 600; 310; 30; 20 INJECTION, SOLUTION INTRAVENOUS at 13:29

## 2020-12-01 RX ADMIN — HYDROCODONE BITARTRATE AND ACETAMINOPHEN 1 TABLET: 5; 325 TABLET ORAL at 16:20

## 2020-12-01 RX ADMIN — PHENYLEPHRINE HYDROCHLORIDE 80 MCG: 10 INJECTION, SOLUTION INTRAMUSCULAR; INTRAVENOUS; SUBCUTANEOUS at 13:45

## 2020-12-01 RX ADMIN — Medication 0.8 MILLICURIE: at 13:54

## 2020-12-01 RX ADMIN — ONDANSETRON 4 MG: 2 INJECTION INTRAMUSCULAR; INTRAVENOUS at 15:32

## 2020-12-01 RX ADMIN — SUGAMMADEX 200 MG: 100 INJECTION, SOLUTION INTRAVENOUS at 14:47

## 2020-12-01 RX ADMIN — ROCURONIUM BROMIDE 50 MG: 10 INJECTION INTRAVENOUS at 13:40

## 2020-12-01 RX ADMIN — CEFAZOLIN SODIUM 2 G: 2 SOLUTION INTRAVENOUS at 13:45

## 2020-12-01 RX ADMIN — FENTANYL CITRATE 50 MCG: 50 INJECTION INTRAMUSCULAR; INTRAVENOUS at 14:27

## 2020-12-01 RX ADMIN — DEXAMETHASONE SODIUM PHOSPHATE 8 MG: 4 INJECTION, SOLUTION INTRAMUSCULAR; INTRAVENOUS at 13:58

## 2020-12-01 RX ADMIN — LIDOCAINE HYDROCHLORIDE 100 MG: 20 INJECTION, SOLUTION INTRAVENOUS at 13:40

## 2020-12-01 RX ADMIN — FENTANYL CITRATE 50 MCG: 50 INJECTION INTRAMUSCULAR; INTRAVENOUS at 13:40

## 2020-12-01 RX ADMIN — PROPOFOL 200 MG: 10 INJECTION, EMULSION INTRAVENOUS at 13:40

## 2020-12-01 RX ADMIN — FENTANYL CITRATE 25 MCG: 50 INJECTION, SOLUTION INTRAMUSCULAR; INTRAVENOUS at 15:32

## 2020-12-01 ASSESSMENT — PULMONARY FUNCTION TESTS
PIF_VALUE: 23
PIF_VALUE: 27
PIF_VALUE: 30
PIF_VALUE: 25
PIF_VALUE: 30
PIF_VALUE: 28
PIF_VALUE: 24
PIF_VALUE: 2
PIF_VALUE: 23
PIF_VALUE: 23
PIF_VALUE: 28
PIF_VALUE: 24
PIF_VALUE: 30
PIF_VALUE: 28
PIF_VALUE: 25
PIF_VALUE: 31
PIF_VALUE: 23
PIF_VALUE: 25
PIF_VALUE: 27
PIF_VALUE: 26
PIF_VALUE: 25
PIF_VALUE: 28
PIF_VALUE: 27
PIF_VALUE: 26
PIF_VALUE: 1
PIF_VALUE: 28
PIF_VALUE: 26
PIF_VALUE: 31
PIF_VALUE: 23
PIF_VALUE: 25
PIF_VALUE: 24
PIF_VALUE: 28
PIF_VALUE: 24
PIF_VALUE: 30
PIF_VALUE: 24
PIF_VALUE: 28
PIF_VALUE: 24
PIF_VALUE: 25
PIF_VALUE: 30
PIF_VALUE: 23
PIF_VALUE: 25
PIF_VALUE: 24
PIF_VALUE: 24
PIF_VALUE: 25
PIF_VALUE: 23
PIF_VALUE: 24
PIF_VALUE: 27
PIF_VALUE: 24
PIF_VALUE: 27
PIF_VALUE: 23
PIF_VALUE: 31
PIF_VALUE: 1
PIF_VALUE: 24
PIF_VALUE: 28
PIF_VALUE: 29
PIF_VALUE: 29
PIF_VALUE: 25
PIF_VALUE: 23
PIF_VALUE: 28
PIF_VALUE: 25
PIF_VALUE: 26
PIF_VALUE: 29
PIF_VALUE: 19
PIF_VALUE: 25
PIF_VALUE: 24
PIF_VALUE: 23
PIF_VALUE: 2
PIF_VALUE: 30
PIF_VALUE: 1

## 2020-12-01 ASSESSMENT — PAIN SCALES - GENERAL
PAINLEVEL_OUTOF10: 4
PAINLEVEL_OUTOF10: 5
PAINLEVEL_OUTOF10: 0
PAINLEVEL_OUTOF10: 3
PAINLEVEL_OUTOF10: 7

## 2020-12-01 ASSESSMENT — PAIN DESCRIPTION - DESCRIPTORS: DESCRIPTORS: ACHING

## 2020-12-01 ASSESSMENT — PAIN DESCRIPTION - LOCATION
LOCATION: BREAST

## 2020-12-01 ASSESSMENT — PAIN DESCRIPTION - PAIN TYPE
TYPE: SURGICAL PAIN
TYPE: SURGICAL PAIN

## 2020-12-01 ASSESSMENT — PAIN DESCRIPTION - PROGRESSION: CLINICAL_PROGRESSION: NOT CHANGED

## 2020-12-01 ASSESSMENT — PAIN - FUNCTIONAL ASSESSMENT: PAIN_FUNCTIONAL_ASSESSMENT: ACTIVITIES ARE NOT PREVENTED

## 2020-12-01 ASSESSMENT — PAIN DESCRIPTION - ONSET: ONSET: ON-GOING

## 2020-12-01 ASSESSMENT — PAIN DESCRIPTION - ORIENTATION
ORIENTATION: RIGHT

## 2020-12-01 ASSESSMENT — PAIN DESCRIPTION - FREQUENCY: FREQUENCY: CONTINUOUS

## 2020-12-01 NOTE — H&P
The patient was identified and examined. The surgical site was marked in the operating room. No interval changes in health status since H&P was performed. The patient is cleared to proceed as scheduled.

## 2020-12-01 NOTE — PROGRESS NOTES
Pt arrived from OR, s/p RIGHT SENTINEL NODE BIOPSY - Right, report received form CRNA, pt denies pain at this time

## 2020-12-01 NOTE — PROGRESS NOTES
PACU Transfer to [facility]    Vitals:    12/01/20 1620   BP: 125/76   Pulse: 76   Resp: 18   Temp: 97.7 °F (36.5 °C)   SpO2: 94%         Intake/Output Summary (Last 24 hours) at 12/1/2020 1630  Last data filed at 12/1/2020 1629  Gross per 24 hour   Intake 1390 ml   Output --   Net 1390 ml       Pain assessment:   Pain Level: 4    Patient transferred to care of [facility] RN.    12/1/2020 4:30 PM

## 2020-12-01 NOTE — PROGRESS NOTES
Pt to SDS, pt awake and alert, pt with pain 3/10 states is tolerable, pt denies nausea/dizziness, dressing to THELMA drain site CDI, incision CDI, peripheral IV removed, pt  Christopher Alvarez brought to bedside for THELMA care instructions and d/c instructions reviewed. Ambulatory Surgery/Procedure Discharge Note    Vitals:    12/01/20 1638   BP: 125/60   Pulse: 77   Resp: 18   Temp: 97.3 °F (36.3 °C)   SpO2: 96%     Discharge criteria met per Smiley score. In: 390 [P.O.:240; I.V.:150]  Out: -     Restroom use offered before discharge. Yes    Pain assessment:  present - adequately treated  Pain Level: 3        Patient discharged to home/self care.  Patient discharged via wheel chair by transporter to waiting family/S.O.       12/1/2020 5:12 PM

## 2020-12-01 NOTE — ANESTHESIA POSTPROCEDURE EVALUATION
Department of Anesthesiology  Postprocedure Note    Patient: Hola Devi  MRN: [id number]  YOB: 1950  Date of evaluation: 12/1/2020  Time:  3:27 PM     Procedure Summary     Date:  12/01/20 Room / Location:  [facility]    Anesthesia Start:  6360 Anesthesia Stop:  1501    Procedure:  RIGHT SENTINEL NODE BIOPSY (Right ) Diagnosis:  (MALIGNANT NEOPLASM OF LOWER INNER QUADRANT OF RIGHT FEMALE BREAST)    Surgeon:  Raynaldo Koyanagi, MD Responsible Provider:  José Manuel Pate DO    Anesthesia Type:  general ASA Status:  3          Anesthesia Type: general    Smilye Phase I: Smiley Score: 7    Smiley Phase II:      Last vitals: Reviewed and per EMR flowsheets.        Anesthesia Post Evaluation    Patient location during evaluation: PACU  Patient participation: complete - patient participated  Level of consciousness: awake  Pain score: 2  Airway patency: patent  Nausea & Vomiting: no nausea and no vomiting  Complications: no  Respiratory status: acceptable  Hydration status: euvolemic

## 2020-12-01 NOTE — ANESTHESIA PRE PROCEDURE
Department of Anesthesiology  Preprocedure Note       Name:  Johnny Webb   Age:  72 y.o.  :  1950                                          MRN:  [id number]         Date:  2020      Surgeon: Deanne Martins):  Elisha Larkin MD    Procedure: Procedure(s):  RIGHT BREAST SENTINEL NODE BIOPSY    Medications prior to admission:   Prior to Admission medications    Medication Sig Start Date End Date Taking?  Authorizing Provider   vitamin B-12 (CYANOCOBALAMIN) 100 MCG tablet Take 50 mcg by mouth daily   Yes Historical Provider, MD   albuterol sulfate HFA (VENTOLIN HFA) 108 (90 Base) MCG/ACT inhaler Inhale 2 puffs into the lungs every 6 hours as needed for Wheezing or Shortness of Breath 20  Yes Michael Barr MD   VITAMIN D PO Take by mouth   Yes Historical Provider, MD   potassium chloride (KLOR-CON) 10 MEQ extended release tablet Take 10 mEq by mouth  19  Yes Historical Provider, MD       Current medications:    Current Facility-Administered Medications   Medication Dose Route Frequency Provider Last Rate Last Dose    ceFAZolin (ANCEF) 2 g in dextrose 3 % 50 mL IVPB (duplex)  2 g Intravenous Once Elisha Larkin MD        lactated ringers infusion   Intravenous Continuous Edvin Aiken DO        lactated ringers infusion   Intravenous Continuous Olman Dumont MD        sodium chloride flush 0.9 % injection 10 mL  10 mL Intravenous 2 times per day Olman Dumont MD        sodium chloride flush 0.9 % injection 10 mL  10 mL Intravenous PRN Olman Dumont MD        lidocaine PF 1 % injection 1 mL  1 mL Intradermal Once PRN Olman Dumont MD           Allergies:  No Known Allergies    Problem List:    Patient Active Problem List   Diagnosis Code    Hypercholesterolemia E78.00    Malignant neoplasm of lower-inner quadrant of breast in female, estrogen receptor positive ([facility] [address] C50.319, Z17.0    Nodule of upper lobe of right lung R91.1    Adenocarcinoma, lung, right ([address] C34.91    S/P thoracotomy Z98.890    Difficult intravenous access Z78.9    Acute postoperative pain G89.18    Ductal carcinoma in situ (DCIS) of left breast D05.12    Breast neoplasm, Tis (DCIS), right D05.11    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 [address] 2019    RML    Anesthesia     per radiologist that did breast biopsy, required 3x normal amount of numbing medication     Breast cancer ([facility] [address]     radiation and lumpectomy-LEFT    COPD (chronic obstructive pulmonary disease) ([facility] [address]     Difficult intravenous access     Hypercholesteremia     Mass of right lung 2019    Nodule of upper lobe of right lung     PONV (postoperative nausea and vomiting)     S/P thoracotomy 2019    RML removed       Past Surgical History:        Procedure Laterality Date    BREAST LUMPECTOMY Left     BREAST SURGERY Right 2020    RIGHT BREAST LUMPECTOMY, PLACEMENT OF BIOZORB MARKER, MAMMOGRAM LATERALITY, 1 TAG, TARGET CALCIFICATIONS (CLIP IF NO RESIDUAL CALCIFICATIONS)  (20) 10:00AM performed by Marisel Hdez MD at [facility] Right 2020    .  performed by Marisel Hdez MD at [address] [place] [address]  2008    KIDNEY SURGERY Left     ablation    OVARY REMOVAL Bilateral 2009    for multiloculated serous cystadenoma    REVISION TOTAL KNEE ARTHROPLASTY Right 2016    THORACOSCOPY Right 2019    Dr. Terry Mora - video assisted w/removal of RML, MSLND    THYROIDECTOMY      PARTIAL    TOTAL KNEE ARTHROPLASTY Right 10/2014    TUMOR EXCISION      from head       Social History:    Social History     Tobacco Use    Smoking status: Former Smoker     Packs/day: 1.00     Years: 20.00     Pack years: 20.00     Types: Cigarettes     Last attempt to quit: 2015     Years since quittin.6    Smokeless tobacco: Never Used   Substance Use Topics    Alcohol use: Yes     Comment: VERY RARE                                Counseling given: Not Answered      Vital Signs (Current):   Vitals:    11/25/20 1519 12/01/20 1010   BP:  (!) 157/104   Pulse:  75   Resp:  18   Temp:  98.1 °F (36.7 °C)   TempSrc:  Oral   SpO2:  94%   Weight: 259 lb (117.5 kg) 245 lb (111.1 kg)   Height: 5' 3\" (1.6 m) 5' 3\" (1.6 m)                                              BP Readings from Last 3 Encounters:   12/01/20 (!) 157/104   11/17/20 101/60   11/17/20 132/76       NPO Status:                                                                                 BMI:   Wt Readings from Last 3 Encounters:   12/01/20 245 lb (111.1 kg)   11/17/20 259 lb (117.5 kg)   10/01/20 259 lb (117.5 kg)     Body mass index is 43.4 kg/m². CBC:   Lab Results   Component Value Date    WBC 7.1 07/10/2019    RBC 4.96 07/10/2019    HGB 15.1 07/10/2019    HCT 45.5 07/10/2019    MCV 91.7 07/10/2019    RDW 13.9 07/10/2019     07/10/2019       CMP:   Lab Results   Component Value Date     07/24/2019    K 4.9 07/24/2019     07/24/2019    CO2 23 07/24/2019    BUN 14 07/24/2019    CREATININE 0.8 07/24/2019    GFRAA >60 07/24/2019    AGRATIO 0.9 07/24/2019    LABGLOM >60 07/24/2019    GLUCOSE 136 07/24/2019    PROT 6.3 07/24/2019    CALCIUM 8.0 07/24/2019    BILITOT <0.2 07/24/2019    ALKPHOS 84 07/24/2019    AST 31 07/24/2019    ALT 35 07/24/2019       POC Tests: No results for input(s): POCGLU, POCNA, POCK, POCCL, POCBUN, POCHEMO, POCHCT in the last 72 hours.     Coags:   Lab Results   Component Value Date    PROTIME 11.1 07/10/2019    INR 0.97 07/10/2019    APTT 35.7 07/10/2019       HCG (If Applicable): No results found for: PREGTESTUR, PREGSERUM, HCG, HCGQUANT     ABGs: No results found for: PHART, PO2ART, MAX9VMI, GJW6BXF, BEART, F7HJXQPN     Type & Screen (If Applicable):  No results found for: LABABO, LABRH    Drug/Infectious Status (If Applicable):  No results found for: HIV, HEPCAB    COVID-19 Screening (If Applicable):   Lab Results   Component Value Date    COVID19 Not Detected 11/26/2020         Anesthesia Evaluation  Patient summary reviewed no history of anesthetic complications:   Airway: Mallampati: III  TM distance: >3 FB   Neck ROM: full  Mouth opening: > = 3 FB Dental: normal exam         Pulmonary:   (+) COPD:                            ROS comment: Hx of lung Ca with right middle lobe removed 3 yrs ago   Cardiovascular:                      Neuro/Psych:               GI/Hepatic/Renal:   (+) morbid obesity (BMI 43.5)          Endo/Other:                      ROS comment: 10  Yrs ago left breast ca and now right breast Ca Abdominal:           Vascular:                                        Anesthesia Plan      general     ASA 3       Induction: intravenous. Anesthetic plan and risks discussed with patient.                       Yulissa Myers MD   12/1/2020

## 2020-12-01 NOTE — BRIEF OP NOTE
Brief Postoperative Note      Patient: Vangie Ratliff  YOB: 1950  MRN: [id number]    Date of Procedure: 12/1/2020    Pre-Op Diagnosis: MALIGNANT NEOPLASM OF LOWER INNER QUADRANT OF RIGHT FEMALE BREAST    Post-Op Diagnosis: Same       Procedure(s):  RIGHT SENTINEL NODE BIOPSY    Surgeon(s):  Lamar Oleary MD    Assistant:  Surgical Assistant: Nate Summers    Anesthesia: General    Estimated Blood Loss (mL): less than 50     Complications: None    Specimens:   ID Type Source Tests Collected by Time Destination   A : SENTINEL NODE #1 RIGHT AXILLA **BLUE, LEVEL 2, 2582 Tissue Tissue SURGICAL PATHOLOGY Lamar Oleary MD 12/1/2020 1411    B : SENTINEL NODE #2 RIGHT AXILLA **BLUE, LEVEL 2, 1124 Dylon Edward MD 12/1/2020 1415    C : SENTINEL NODE #3 RIGHT AXILLA **BLUE, LEVEL 2, 1124 Dylon Edward MD 12/1/2020 1422    D : RIGHT AXILLARY NODE Tissue Tissue SURGICAL PATHOLOGY Lamar Oleary MD 12/1/2020 1423        Implants:  * No implants in log *      Drains:   Closed/Suction Drain Lateral RUQ Bulb 15 [place] (Active)   Site Description Other (Comment) 12/01/20 1458   Dressing Status Dry; Intact; Clean 12/01/20 1458   Status To bulb suction 12/01/20 1458       Findings: sentinel nodes x 3, sentinel node #1 negative for malignancy    Electronically signed by Lamar Oleary MD on 12/1/2020 at 4:00 PM

## 2020-12-01 NOTE — H&P
66 [address] García    [id number]    [facility] Same Day Surgery Update H & P  Department of General Surgery   Surgical Service   Pre-operative History and Physical  Last H & P within the last 30 days. DIAGNOSIS:   MALIGNANT NEOPLASM OF LOWER INNER QUADRANT OF RIGHT FEMALE BREAST    Procedure(s):  RIGHT BREAST SENTINEL NODE BIOPSY     HISTORY OF PRESENT ILLNESS:   Patient is a morbidly obese (Body mass index is 43.4 kg/m².), 72 y.o. female patient is S/P Right lumpectomy with placement of BioZorb marker on 11/17/20. Surgical pathology demonstrated \"invasive ductal carcinoma, grade 3 and high-grade DCIS extensive, with comedonecrosis\" and the patient presents today for the above procedure. Covid 19:  Patient denies fever, chills, cough or known exposure to Covid-19. Past Medical History:        Diagnosis Date    Adenocarcinoma, lung, right ([address] 07/2019    RML    Anesthesia     per radiologist that did breast biopsy, required 3x normal amount of numbing medication     Breast cancer [facility] 2008    radiation and lumpectomy-LEFT    COPD (chronic obstructive pulmonary disease) ([facility] [address] [facility]     Difficult intravenous access     Hypercholesteremia     Mass of right lung 7/23/2019    Nodule of upper lobe of right lung     PONV (postoperative nausea and vomiting)     S/P thoracotomy 07/2019    RML removed     Past Surgical History:        Procedure Laterality Date    BREAST LUMPECTOMY Left 2008    BREAST SURGERY Right 11/17/2020    RIGHT BREAST LUMPECTOMY, PLACEMENT OF BIOZORB MARKER, MAMMOGRAM LATERALITY, 1 TAG, TARGET CALCIFICATIONS (CLIP IF NO RESIDUAL CALCIFICATIONS)  (11-11-20) 10:00AM performed by Ju Wilson MD at [address] Right 11/17/2020    .  performed by Ju Wilson MD at [facility]  08/11/2008    KIDNEY SURGERY Left     ablation    OVARY REMOVAL Bilateral 04/08/2009    for multiloculated serous cystadenoma    REVISION TOTAL KNEE ARTHROPLASTY Right 2016    THORACOSCOPY Right 2019    Dr. Divina Ragsdale - video assisted w/removal of RML, MSLND    THYROIDECTOMY      PARTIAL    TOTAL KNEE ARTHROPLASTY Right 10/2014    TUMOR EXCISION      from head     Past Social History:  Social History     Socioeconomic History    Marital status:      Spouse name: None    Number of children: None    Years of education: None    Highest education level: None   Occupational History    None   Social Needs    Financial resource strain: None    Food insecurity     Worry: None     Inability: None    Transportation needs     Medical: None     Non-medical: None   Tobacco Use    Smoking status: Former Smoker     Packs/day: 1.00     Years: 20.00     Pack years: 20.00     Types: Cigarettes     Last attempt to quit: 2015     Years since quittin.6    Smokeless tobacco: Never Used   Substance and Sexual Activity    Alcohol use: Yes     Comment: VERY RARE    Drug use: Never    Sexual activity: None   Lifestyle    Physical activity     Days per week: None     Minutes per session: None    Stress: None   Relationships    Social connections     Talks on phone: None     Gets together: None     Attends Gnosticism service: None     Active member of club or organization: None     Attends meetings of clubs or organizations: None     Relationship status: None    Intimate partner violence     Fear of current or ex partner: None     Emotionally abused: None     Physically abused: None     Forced sexual activity: None   Other Topics Concern    None   Social History Narrative    None         Medications Prior to Admission:      Prior to Admission medications    Medication Sig Start Date End Date Taking?  Authorizing Provider   vitamin B-12 (CYANOCOBALAMIN) 100 MCG tablet Take 50 mcg by mouth daily   Yes Historical Provider, MD   albuterol sulfate HFA (VENTOLIN HFA) 108 (90 Base) MCG/ACT inhaler Inhale 2 puffs into the lungs every 6 hours as needed for Wheezing or Shortness of Breath 11/4/20  Yes Acosta Worley MD   VITAMIN D PO Take by mouth   Yes Historical Provider, MD   potassium chloride (KLOR-CON) 10 MEQ extended release tablet Take 10 mEq by mouth  7/25/19  Yes Historical Provider, MD         Allergies:  Patient has no known allergies. PHYSICAL EXAM:      BP (!) 153/93   Pulse 75   Temp 98.1 °F (36.7 °C) (Oral)   Resp 18   Ht 5' 3\" (1.6 m)   Wt 245 lb (111.1 kg)   SpO2 94%   BMI 43.40 kg/m²      Airway:  Airway patent with no audible stridor    Heart:  regular rate and rhythm, No murmur noted    Lungs:  No increased work of breathing, good air exchange, clear to auscultation bilaterally, no crackles or wheezing    Abdomen:  soft, non-distended, non-tender, no rebound tenderness or guarding, normal active bowel sounds and no masses palpated    ASSESSMENT AND PLAN     Patient is a 72 y.o. female with above specified procedure planned. 1.  Patient seen and focused exam done today- last physical exam on 11/16/20. The only change in the patient's H&P is the procedure detailed in the HPI above    2. Access to ancillary services are available per request of the provider.     ELIF Blackwood - CATY     12/1/2020

## 2020-12-04 NOTE — OP NOTE
Operative Note      Patient: Rimma Palma  YOB: 1950  MRN: [id number]    Date of Procedure: 12/1/2020    Pre-Op Diagnosis: MALIGNANT NEOPLASM OF LOWER INNER QUADRANT OF RIGHT FEMALE BREAST    Post-Op Diagnosis: Same       Procedure(s):  RIGHT SENTINEL NODE BIOPSY    Surgeon(s):  Marisel Hdez MD    Assistant:   Surgical Assistant: Pedro Luis Boston    Anesthesia: General    Estimated Blood Loss (mL): less than 50     Complications: None    Specimens:   ID Type Source Tests Collected by Time Destination   A : SENTINEL NODE #1 RIGHT AXILLA **BLUE, LEVEL 2, 2582 Tissue Tissue SURGICAL PATHOLOGY Marisel Hdez MD 12/1/2020 1411    B : SENTINEL NODE #2 RIGHT AXILLA **BLUE, LEVEL 2, 1124 Dylon Escobar MD 12/1/2020 1415    C : SENTINEL NODE #3 RIGHT AXILLA **BLUE, LEVEL 2, 1124 Dylon Escobar MD 12/1/2020 1422    D : RIGHT AXILLARY NODE Tissue Tissue SURGICAL PATHOLOGY Marisel Hdez MD 12/1/2020 1423        Implants:  * No implants in log *      Drains:   Closed/Suction Drain Lateral RUQ Bulb 15 [place] (Active)   Site Description Other (Comment) 12/01/20 1458   Dressing Status Dry; Intact; Clean 12/01/20 1458   Status To bulb suction 12/01/20 1458       Findings: sentinel nodes x 3, sentinel node #1 negative for malignancy on frozen diagnosis    Indications: The patient is a 72year old who underwent lumpectomy for DCIS of the right breast. On pathology, she was found to have an 8 mm invasive ductal carcinoma. She is brought to the operating room for sentinel node biopsy to complete her staging. Detailed Description of Procedure: The patient was taken to the operating room and placed in the supine position. After induction of general anesthesia, a periareolar injection of Tc sulfur colloid and peritumoral injection of isosulfan blue were performed.  The breast was massaged for several minutes to promote lymphatic

## 2021-04-22 ENCOUNTER — VIRTUAL VISIT (OUTPATIENT)
Dept: PULMONOLOGY | Age: 71
End: 2021-04-22
Payer: MEDICARE

## 2021-04-22 ENCOUNTER — HOSPITAL ENCOUNTER (OUTPATIENT)
Dept: CT IMAGING | Age: 71
Discharge: HOME | End: 2021-04-22
Payer: MEDICARE

## 2021-04-22 DIAGNOSIS — R93.89 ABNORMAL CT OF THE CHEST: ICD-10-CM

## 2021-04-22 DIAGNOSIS — R91.8 PULMONARY NODULES: ICD-10-CM

## 2021-04-22 DIAGNOSIS — R93.89 ABNORMAL CT SCAN: ICD-10-CM

## 2021-04-22 DIAGNOSIS — R91.1 PULMONARY NODULE: Primary | ICD-10-CM

## 2021-04-22 DIAGNOSIS — C34.91 ADENOCARCINOMA OF RIGHT LUNG (HCC): ICD-10-CM

## 2021-04-22 DIAGNOSIS — J44.9 CHRONIC OBSTRUCTIVE PULMONARY DISEASE, UNSPECIFIED COPD TYPE (HCC): ICD-10-CM

## 2021-04-22 PROCEDURE — 99443 PR PHYS/QHP TELEPHONE EVALUATION 21-30 MIN: CPT | Performed by: INTERNAL MEDICINE

## 2021-04-22 PROCEDURE — 71250 CT THORAX DX C-: CPT

## 2021-04-22 RX ORDER — ROPINIROLE 0.25 MG/1
0.25 TABLET, FILM COATED ORAL 2 TIMES DAILY
COMMUNITY

## 2021-04-22 RX ORDER — ALBUTEROL SULFATE 90 UG/1
2 AEROSOL, METERED RESPIRATORY (INHALATION) EVERY 6 HOURS PRN
Qty: 1 INHALER | Refills: 6 | Status: SHIPPED | OUTPATIENT
Start: 2021-04-22 | End: 2022-07-14 | Stop reason: SDUPTHER

## 2021-04-22 RX ORDER — CIPROFLOXACIN 500 MG/1
500 TABLET, FILM COATED ORAL 2 TIMES DAILY
COMMUNITY
End: 2022-01-04

## 2021-04-22 RX ORDER — METRONIDAZOLE 500 MG/1
500 TABLET ORAL ONCE
COMMUNITY
End: 2022-01-04

## 2021-04-22 NOTE — PROGRESS NOTES
P Pulmonary, Critical Care and Sleep Specialists                                                              TELEHEALTH EVALUATION: Service performed was Audio (During [id number] public health emergency) and not a face-to-face visit       CHIEF COMPLAINT: Follow-up CT chest         HPI:   CT chest reviewed by me and noted below. Results were dicussed with patient and multiple good questions were answered. Doing okay over all  Uses Albuterol 3 times/week   No smoking  No cough         From prior visit:   80years old female had CT chest 12/4/2018 for SOB. CT reviewed by me and showed growing RML nodule. Followed by PET scan 3/23/19 reviewed by me and showed no metabolic activities. History of L breast cancer 7-10 years ago underwent surgery followed by radiation. Quit smoking 4 years ago. Smoked 1 ppd for 20 years. No weight loss. Uses Albuterol PRN. Mild CHILDS, able to walk as far as she wants. Dyspnea worse with exertion and better with resting. No associated cough or wheezes.      Past Medical History:   Diagnosis Date    Adenocarcinoma, lung, right ([address] 07/2019    RML    Anesthesia     per radiologist that did breast biopsy, required 3x normal amount of numbing medication     Breast cancer ([address] 2008    radiation and lumpectomy-LEFT    COPD (chronic obstructive pulmonary disease) [facility] [address] [facility]     Difficult intravenous access     Hypercholesteremia     Mass of right lung 7/23/2019    Nodule of upper lobe of right lung     PONV (postoperative nausea and vomiting)     S/P thoracotomy 07/2019    RML removed       Past Surgical History:        Procedure Laterality Date    BREAST BIOPSY Right 12/1/2020    RIGHT SENTINEL NODE BIOPSY performed by Christina Avila MD at [address] [place] [address] LUMPECTOMY Left 2008    BREAST SURGERY Right 11/17/2020    RIGHT BREAST LUMPECTOMY, PLACEMENT OF BIOZORB MARKER, MAMMOGRAM LATERALITY, 1 TAG, TARGET CALCIFICATIONS (CLIP IF NO RESIDUAL CALCIFICATIONS)  (11-11-20) 10:00AM performed by Bernabe Lee MD at [address] Right 11/17/2020    . performed by Bernabe Lee MD at [facility]  08/11/2008    KIDNEY SURGERY Left     ablation    OVARY REMOVAL Bilateral 04/08/2009    for multiloculated serous cystadenoma    REVISION TOTAL KNEE ARTHROPLASTY Right 07/2016    THORACOSCOPY Right 7/23/2019    Dr. Cj Lieberman - video assisted w/removal of RML, MSLND    THYROIDECTOMY      PARTIAL    TOTAL KNEE ARTHROPLASTY Right 10/2014    TUMOR EXCISION      from head       Allergies:  has No Known Allergies. Social History:    TOBACCO:   reports that she quit smoking about 6 years ago. Her smoking use included cigarettes. She has a 20.00 pack-year smoking history. She has never used smokeless tobacco.  ETOH:   reports current alcohol use.       Family History:       Problem Relation Age of Onset    Cancer Mother         mutiple myeloma    Cancer Father         colon    Asthma Neg Hx     Diabetes Neg Hx     Emphysema Neg Hx     Heart Failure Neg Hx        Current Medications:    Current Outpatient Medications:     vitamin B-12 (CYANOCOBALAMIN) 100 MCG tablet, Take 50 mcg by mouth daily, Disp: , Rfl:     albuterol sulfate HFA (VENTOLIN HFA) 108 (90 Base) MCG/ACT inhaler, Inhale 2 puffs into the lungs every 6 hours as needed for Wheezing or Shortness of Breath, Disp: 1 Inhaler, Rfl: 6    VITAMIN D PO, Take by mouth, Disp: , Rfl:     potassium chloride (KLOR-CON) 10 MEQ extended release tablet, Take 10 mEq by mouth , Disp: , Rfl:           Objective:   PHYSICAL EXAM:    Telephone visit not able to obtain physical exam           DATA reviewed by me:   PFTs 04/11/19 FVC (%) FEV1 1.59 (67 %) FEV1/FVC 70% TLC 4.53 (89 %) DLCO 18.01 (80 %) 6MW 1260 F low O2 90%      CT chest 12/4/18   Increased size of 7 mm right middle lobe  3 mm right lower lobe  Lingula opacity scarring versus atelectasis    PET scan 3/23/19  RUL 8 mm nodule with no significant metabolic activities    CT chest 6/13/2019   No evidence of adenopathy  RML 9 mm nodule, increased in size from CT 2/27/2019    CT chest 1/13/2020    Right lower lobe 1.5 x 0.8 cm irregular shaped bandlike opacity    CT chest 4/22/2020   Stable RUL 3 mm nodule  Stable 1.2 cm right adrenal nodule    CT chest 11/4/2020   Stable pulmonary nodules     CT chest 4/22/2021 imaging reviewed by me and showed  Stable right lower lobe nodules  No new nodules      RML lobectomy 7/23/2019   A. Level 10R lymph node:     - One benign lymph node. B. Level 11R lymph node:     - One benign lymph node. C. Right middle lobe lung, lobectomy:     - Invasive well-differentiated pulmonary adenocarcinoma, acinar       predominant - tumor mass is 1.0 cm in maximal dimensions.     - Separate focus of lepidic predominant adenocarcinoma - 0.9 cm in       maximal dimensions.     - Tumor does not involve the pleural surface.     - The bronchial margin and hilar vascular margins are negative.     - Two benign hilar lymph nodes. Assessment:       · Abnormal CT chest 1/13/2020 with right lower lobe irregular shaped bandlike opacity-favoring atelectasis. Stable/smaller on repeat CT chest 4/22/2021  · RLL 3 mm nodule stable since 6/13/2019. Most recent CT 4/22/2021  · Moderate COPD  · RML 9 mm nodule on CT 2/27/19. Grown from CT 11/27/17, however not significantly changed between CT 12/4/18 and PET scan 3/23/19. Negative PET scan 3/23/19- SUV 1. Growing on repeat CT 6/13/2019. RML lobectomy 7/23/2019 showed invasive well-differentiated pulmonary adenocarcinoma [id number]. · History of L breast cancer 7-10 years ago underwent surgery followed by radiation.  Recurrence December 2020 underwent surgery followed by chemotherapy/radiation   · 20 pack year smoking- quit 2015       Plan:       · Repeat surveillance CT chest 10/2021   · Continue albuterol 2 puffs Q4-6 hrs PRN  · Patient is up to date with pneumococcal

## 2021-06-17 ENCOUNTER — HOSPITAL ENCOUNTER (OUTPATIENT)
Dept: MAMMOGRAPHY | Age: 71
Discharge: HOME | End: 2021-06-17
Payer: MEDICARE

## 2021-06-17 DIAGNOSIS — Z85.3 PERSONAL HISTORY OF BREAST CANCER: ICD-10-CM

## 2021-06-17 PROCEDURE — G0279 TOMOSYNTHESIS, MAMMO: HCPCS

## 2021-11-24 ENCOUNTER — TELEPHONE (OUTPATIENT)
Dept: PULMONOLOGY | Age: 71
End: 2021-11-24

## 2021-11-24 NOTE — TELEPHONE ENCOUNTER
Dr. Paul Crump office called asking for pt to have a sooner appt, COPD is worsening. Dr. Urbano Rubio office is sending visit note from pt appt today. VV 4/22/21:    Assessment:       · Abnormal CT chest 1/13/2020 with right lower lobe irregular shaped bandlike opacity-favoring atelectasis. Stable/smaller on repeat CT chest 4/22/2021  · RLL 3 mm nodule stable since 6/13/2019. Most recent CT 4/22/2021  · Moderate COPD  · RML 9 mm nodule on CT 2/27/19. Grown from CT 11/27/17, however not significantly changed between CT 12/4/18 and PET scan 3/23/19. Negative PET scan 3/23/19- SUV 1. Growing on repeat CT 6/13/2019. RML lobectomy 7/23/2019 showed invasive well-differentiated pulmonary adenocarcinoma [id number]. · History of L breast cancer 7-10 years ago underwent surgery followed by radiation. Recurrence December 2020 underwent surgery followed by chemotherapy/radiation   · 20 pack year smoking- quit 2015       Plan:       · Repeat surveillance CT chest 10/2021   · Continue albuterol 2 puffs Q4-6 hrs PRN  · Patient is up to date with pneumococcal vaccine and influenza vaccine   · Advised to continue with smoking cessation.   · Followed up with Dr. Grant Molina

## 2022-01-04 ENCOUNTER — OFFICE VISIT (OUTPATIENT)
Dept: PULMONOLOGY | Age: 72
End: 2022-01-04
Payer: MEDICARE

## 2022-01-04 ENCOUNTER — TELEPHONE (OUTPATIENT)
Dept: PULMONOLOGY | Age: 72
End: 2022-01-04

## 2022-01-04 ENCOUNTER — HOSPITAL ENCOUNTER (OUTPATIENT)
Dept: CT IMAGING | Age: 72
Discharge: HOME | End: 2022-01-04
Payer: MEDICARE

## 2022-01-04 VITALS
BODY MASS INDEX: 48.2 KG/M2 | HEIGHT: 63 IN | RESPIRATION RATE: 19 BRPM | HEART RATE: 84 BPM | SYSTOLIC BLOOD PRESSURE: 150 MMHG | DIASTOLIC BLOOD PRESSURE: 100 MMHG | OXYGEN SATURATION: 94 % | WEIGHT: 272 LBS

## 2022-01-04 DIAGNOSIS — J44.9 MODERATE COPD (CHRONIC OBSTRUCTIVE PULMONARY DISEASE) (HCC): Primary | ICD-10-CM

## 2022-01-04 DIAGNOSIS — Z85.3 HX OF BREAST CANCER: ICD-10-CM

## 2022-01-04 DIAGNOSIS — Z87.891 HISTORY OF TOBACCO USE: ICD-10-CM

## 2022-01-04 DIAGNOSIS — J44.9 CHRONIC OBSTRUCTIVE PULMONARY DISEASE, UNSPECIFIED COPD TYPE (HCC): ICD-10-CM

## 2022-01-04 DIAGNOSIS — R91.1 PULMONARY NODULE: ICD-10-CM

## 2022-01-04 DIAGNOSIS — R93.89 ABNORMAL CT SCAN: ICD-10-CM

## 2022-01-04 DIAGNOSIS — R93.89 ABNORMAL CT OF THE CHEST: ICD-10-CM

## 2022-01-04 PROBLEM — K57.20 PERFORATION OF SIGMOID COLON DUE TO DIVERTICULITIS: Status: ACTIVE | Noted: 2021-03-30

## 2022-01-04 PROBLEM — F17.200 NICOTINE DEPENDENCE: Status: ACTIVE | Noted: 2022-01-04

## 2022-01-04 PROCEDURE — 1123F ACP DISCUSS/DSCN MKR DOCD: CPT | Performed by: INTERNAL MEDICINE

## 2022-01-04 PROCEDURE — G8417 CALC BMI ABV UP PARAM F/U: HCPCS | Performed by: INTERNAL MEDICINE

## 2022-01-04 PROCEDURE — G9899 SCRN MAM PERF RSLTS DOC: HCPCS | Performed by: INTERNAL MEDICINE

## 2022-01-04 PROCEDURE — 1036F TOBACCO NON-USER: CPT | Performed by: INTERNAL MEDICINE

## 2022-01-04 PROCEDURE — 3017F COLORECTAL CA SCREEN DOC REV: CPT | Performed by: INTERNAL MEDICINE

## 2022-01-04 PROCEDURE — 99214 OFFICE O/P EST MOD 30 MIN: CPT | Performed by: INTERNAL MEDICINE

## 2022-01-04 PROCEDURE — G8427 DOCREV CUR MEDS BY ELIG CLIN: HCPCS | Performed by: INTERNAL MEDICINE

## 2022-01-04 PROCEDURE — 1090F PRES/ABSN URINE INCON ASSESS: CPT | Performed by: INTERNAL MEDICINE

## 2022-01-04 PROCEDURE — 3023F SPIROM DOC REV: CPT | Performed by: INTERNAL MEDICINE

## 2022-01-04 PROCEDURE — G8484 FLU IMMUNIZE NO ADMIN: HCPCS | Performed by: INTERNAL MEDICINE

## 2022-01-04 PROCEDURE — 4040F PNEUMOC VAC/ADMIN/RCVD: CPT | Performed by: INTERNAL MEDICINE

## 2022-01-04 PROCEDURE — G8400 PT W/DXA NO RESULTS DOC: HCPCS | Performed by: INTERNAL MEDICINE

## 2022-01-04 PROCEDURE — 71250 CT THORAX DX C-: CPT

## 2022-01-04 RX ORDER — OMEPRAZOLE 20 MG/1
20 CAPSULE, DELAYED RELEASE ORAL
COMMUNITY

## 2022-01-04 RX ORDER — SIMVASTATIN 40 MG
40 TABLET ORAL NIGHTLY
COMMUNITY

## 2022-01-04 NOTE — PROGRESS NOTES
P Pulmonary, Critical Care and Sleep Specialists                                                                CHIEF COMPLAINT: Follow-up CT chest         HPI:   CT chest reviewed by me and noted below. Results were dicussed with patient and multiple good questions were answered. Doing good   Uses Albuterol BID   No smoking  Some cough with no sputum        From prior visit:   80years old female had CT chest 12/4/2018 for SOB. CT reviewed by me and showed growing RML nodule. Followed by PET scan 3/23/19 reviewed by me and showed no metabolic activities. History of L breast cancer 7-10 years ago underwent surgery followed by radiation. Quit smoking 4 years ago. Smoked 1 ppd for 20 years. No weight loss. Uses Albuterol PRN. Mild CHILDS, able to walk as far as she wants. Dyspnea worse with exertion and better with resting. No associated cough or wheezes.      Past Medical History:   Diagnosis Date    Adenocarcinoma, lung, right ([address] 07/2019    RML    Anesthesia     per radiologist that did breast biopsy, required 3x normal amount of numbing medication     Breast cancer ([address] 2008    radiation and lumpectomy-LEFT    COPD (chronic obstructive pulmonary disease) ([address]     Difficult intravenous access     History of therapeutic radiation     Hx antineoplastic chemo     Hypercholesteremia     Mass of right lung 7/23/2019    Nodule of upper lobe of right lung     PONV (postoperative nausea and vomiting)     S/P thoracotomy 07/2019    RML removed       Past Surgical History:        Procedure Laterality Date    BREAST BIOPSY Right 12/1/2020    RIGHT SENTINEL NODE BIOPSY performed by Heather Gaston MD at [address] [place] [address] LUMPECTOMY Left 2008    BREAST SURGERY Right 11/17/2020    RIGHT BREAST LUMPECTOMY, PLACEMENT OF BIOZORB MARKER, MAMMOGRAM LATERALITY, 1 TAG, TARGET CALCIFICATIONS (CLIP IF NO RESIDUAL CALCIFICATIONS)  (11-11-20) 10:00AM performed by Heather Gaston MD at [address] Right 11/17/2020    . performed by Sai Cuba MD at [address] [place] [address]  08/11/2008    KIDNEY SURGERY Left     ablation    OVARY REMOVAL Bilateral 04/08/2009    for multiloculated serous cystadenoma    REVISION TOTAL KNEE ARTHROPLASTY Right 07/2016    THORACOSCOPY Right 7/23/2019    Dr. Diamond Velez - video assisted w/removal of RML, MSLND    THYROIDECTOMY      PARTIAL    TOTAL KNEE ARTHROPLASTY Right 10/2014    TUMOR EXCISION      from head       Allergies:  has No Known Allergies. Social History:    TOBACCO:   reports that she quit smoking about 6 years ago. Her smoking use included cigarettes. She has a 20.00 pack-year smoking history. She has never used smokeless tobacco.  ETOH:   reports current alcohol use.       Family History:       Problem Relation Age of Onset    Cancer Mother         mutiple myeloma    Cancer Father         colon    Asthma Neg Hx     Diabetes Neg Hx     Emphysema Neg Hx     Heart Failure Neg Hx        Current Medications:    Current Outpatient Medications:     vitamin D (CHOLECALCIFEROL) 51773 UNIT CAPS, Take 50,000 Units by mouth once a week, Disp: , Rfl:     omeprazole (PRILOSEC) 20 MG delayed release capsule, Take 20 mg by mouth every morning (before breakfast), Disp: , Rfl:     simvastatin (ZOCOR) 40 MG tablet, Take 40 mg by mouth nightly, Disp: , Rfl:     rOPINIRole (REQUIP) 0.25 MG tablet, Take 0.25 mg by mouth 2 times daily, Disp: , Rfl:     albuterol sulfate HFA (VENTOLIN HFA) 108 (90 Base) MCG/ACT inhaler, Inhale 2 puffs into the lungs every 6 hours as needed for Wheezing or Shortness of Breath, Disp: 1 Inhaler, Rfl: 6    vitamin B-12 (CYANOCOBALAMIN) 100 MCG tablet, Take 50 mcg by mouth daily, Disp: , Rfl:     potassium chloride (KLOR-CON) 10 MEQ extended release tablet, Take 10 mEq by mouth  (Patient not taking: Reported on 1/4/2022), Disp: , Rfl:           Objective:   PHYSICAL EXAM:    BP (!) 150/100   Pulse 84   Resp 19   Ht 5' 3\" (1.6 m)   Wt 272 lb (123.4 kg) Comment: weight is correct  SpO2 94%   BMI 48.18 kg/m²   Gen: No distress. Eyes: PERRL. No sclera icterus. No conjunctival injection. ENT: No discharge. Pharynx clear. Neck: Trachea midline. No obvious mass. Resp: No accessory muscle use. No crackles. No wheezes. No rhonchi. No dullness on percussion. Good air entry. CV: Regular rate. Regular rhythm. No murmur or rub. No edema. GI: Non-tender. Non-distended. No hernia. Skin: Warm and dry. No nodule on exposed extremities. Lymph: No cervical LAD. No supraclavicular LAD. M/S: No cyanosis. No joint deformity. No clubbing. Neuro: Awake. Alert. Moves all four extremities. Psych: Oriented x 3. No anxiety. DATA reviewed by me:   PFTs 04/11/19 FVC (%) FEV1 1.59 (67 %) FEV1/FVC 70% TLC 4.53 (89 %) DLCO 18.01 (80 %) 6MW 1260 F low O2 90%      CT chest 12/4/18   Increased size of 7 mm right middle lobe  3 mm right lower lobe  Lingula opacity scarring versus atelectasis    PET scan 3/23/19  RUL 8 mm nodule with no significant metabolic activities    CT chest 6/13/2019   No evidence of adenopathy  RML 9 mm nodule, increased in size from CT 2/27/2019    CT chest 1/13/2020    Right lower lobe 1.5 x 0.8 cm irregular shaped bandlike opacity    CT chest 4/22/2020   Stable RUL 3 mm nodule  Stable 1.2 cm right adrenal nodule    CT chest 11/4/2020   Stable pulmonary nodules     CT chest 4/22/2021  Stable right lower lobe nodules  No new nodules    CT chest 1/4/2022 imaging reviewed by me and showed  1. Stable appearance of CT examination of the chest with no radiographic  finding to suggest presence of metastatic disease in this patient with  clinical history of lung carcinoma.   2. Interval development of ovoid area of low attenuation adjacent to surgical  clips within the right breast.  Negative Hounsfield unit measurement raises  the possibility that the finding is related to postsurgical fluid collection  and/or area of fat necrosis in association with the prior surgery. Follow-up  correlation with mammographic examination may be helpful for more complete  evaluation. RML lobectomy 7/23/2019   A. Level 10R lymph node:     - One benign lymph node. B. Level 11R lymph node:     - One benign lymph node. C. Right middle lobe lung, lobectomy:     - Invasive well-differentiated pulmonary adenocarcinoma, acinar       predominant - tumor mass is 1.0 cm in maximal dimensions.     - Separate focus of lepidic predominant adenocarcinoma - 0.9 cm in       maximal dimensions.     - Tumor does not involve the pleural surface.     - The bronchial margin and hilar vascular margins are negative.     - Two benign hilar lymph nodes. Assessment:       · Moderate COPD  · Abnormal right breast imaging on CT chest 1/4/21   · RML 9 mm nodule on CT 2/27/19. Grown from CT 11/27/17, however not significantly changed between CT 12/4/18 and PET scan 3/23/19. Negative PET scan 3/23/19- SUV 1. Growing on repeat CT 6/13/2019. RML lobectomy 7/23/2019 showed invasive well-differentiated pulmonary adenocarcinoma [id number]. No recurrence on repeat CT 1/4/22   · Abnormal CT chest 1/13/2020 with right lower lobe irregular shaped bandlike opacity-favoring atelectasis. Stable/smaller on repeat CT chest   · RLL 3 mm nodule stable since 6/13/2019. · History of L breast cancer 7-10 years ago underwent surgery followed by radiation.  Recurrence December 2020 underwent surgery followed by chemotherapy/radiation   · 20 pack year smoking- quit 2015       Plan:       · Repeat surveillance CT chest 1/2023   · Mammogram and follow up with breast surgery   · Forward CT chest results to Breast surgeon/oncology   · D/C Symbicort   · Trial of Spiriva Respimat: Two inhalations (5 mcg) once daily (maximum: 2 inhalations per 24 hours)  · Continue albuterol 2 puffs Q4-6 hrs PRN  · Patient is up to date with pneumococcal vaccine and influenza vaccine   · Advised to continue with smoking cessation.   · Advised to follow up with Dr. Rose Evans    · Follow up in 6 months or sooner if needed

## 2022-01-04 NOTE — TELEPHONE ENCOUNTER
Results of Ct chest faxed to Dr [facility] fax# [phone]. Will f/u to make sure she is ishmael.

## 2022-01-04 NOTE — TELEPHONE ENCOUNTER
Need to fax Ct results to Breast Surgery and ensure they have received as patient will need repeat mammogram and f/u with them    LOV: 1/4/22    Assessment:       · Moderate COPD  · Abnormal right breast imaging on CT chest 1/4/21   · RML 9 mm nodule on CT 2/27/19. Grown from CT 11/27/17, however not significantly changed between CT 12/4/18 and PET scan 3/23/19. Negative PET scan 3/23/19- SUV 1. Growing on repeat CT 6/13/2019. RML lobectomy 7/23/2019 showed invasive well-differentiated pulmonary adenocarcinoma [id number]. No recurrence on repeat CT 1/4/22   · Abnormal CT chest 1/13/2020 with right lower lobe irregular shaped bandlike opacity-favoring atelectasis. Stable/smaller on repeat CT chest   · RLL 3 mm nodule stable since 6/13/2019. · History of L breast cancer 7-10 years ago underwent surgery followed by radiation. Recurrence December 2020 underwent surgery followed by chemotherapy/radiation   · 20 pack year smoking- quit 2015       Plan:       · Repeat surveillance CT chest 1/2023   · Mammogram and follow up with breast surgery   · Forward CT chest results to Breast surgeon/oncology   · D/C Symbicort   · Trial of Spiriva Respimat: Two inhalations (5 mcg) once daily (maximum: 2 inhalations per 24 hours)  · Continue albuterol 2 puffs Q4-6 hrs PRN  · Patient is up to date with pneumococcal vaccine and influenza vaccine   · Advised to continue with smoking cessation.   · Advised to follow up with Dr. Herbert Rosales    · Follow up in 6 months or sooner if needed

## 2022-01-06 ENCOUNTER — HOSPITAL ENCOUNTER (OUTPATIENT)
Dept: ULTRASOUND IMAGING | Age: 72
Discharge: HOME | End: 2022-01-06
Payer: MEDICARE

## 2022-01-06 ENCOUNTER — HOSPITAL ENCOUNTER (OUTPATIENT)
Dept: MAMMOGRAPHY | Age: 72
Discharge: HOME | End: 2022-01-06
Payer: MEDICARE

## 2022-01-06 VITALS — WEIGHT: 270 LBS | BODY MASS INDEX: 47.84 KG/M2 | HEIGHT: 63 IN

## 2022-01-06 DIAGNOSIS — R93.89 ABNORMAL FINDINGS ON IMAGING TEST: ICD-10-CM

## 2022-01-06 DIAGNOSIS — Z85.3 PERSONAL HISTORY OF BREAST CANCER: ICD-10-CM

## 2022-01-06 PROCEDURE — G0279 TOMOSYNTHESIS, MAMMO: HCPCS

## 2022-01-06 PROCEDURE — 76642 ULTRASOUND BREAST LIMITED: CPT

## 2022-06-30 ENCOUNTER — HOSPITAL ENCOUNTER (OUTPATIENT)
Dept: ULTRASOUND IMAGING | Age: 72
Discharge: HOME | End: 2022-06-30
Payer: MEDICARE

## 2022-06-30 ENCOUNTER — HOSPITAL ENCOUNTER (OUTPATIENT)
Dept: MAMMOGRAPHY | Age: 72
Discharge: HOME | End: 2022-06-30
Payer: MEDICARE

## 2022-06-30 VITALS — HEIGHT: 63 IN | BODY MASS INDEX: 47.84 KG/M2 | WEIGHT: 270 LBS

## 2022-06-30 DIAGNOSIS — Z09 FOLLOW UP: ICD-10-CM

## 2022-06-30 DIAGNOSIS — N63.0 BREAST LUMP: ICD-10-CM

## 2022-06-30 DIAGNOSIS — Z85.3 PERSONAL HISTORY OF MALIGNANT NEOPLASM OF BREAST: ICD-10-CM

## 2022-06-30 PROCEDURE — G0279 TOMOSYNTHESIS, MAMMO: HCPCS

## 2022-06-30 PROCEDURE — 76642 ULTRASOUND BREAST LIMITED: CPT

## 2022-07-14 ENCOUNTER — OFFICE VISIT (OUTPATIENT)
Dept: PULMONOLOGY | Age: 72
End: 2022-07-14
Payer: MEDICARE

## 2022-07-14 VITALS
BODY MASS INDEX: 48.83 KG/M2 | OXYGEN SATURATION: 95 % | DIASTOLIC BLOOD PRESSURE: 84 MMHG | HEART RATE: 73 BPM | WEIGHT: 275.6 LBS | SYSTOLIC BLOOD PRESSURE: 116 MMHG | RESPIRATION RATE: 16 BRPM | HEIGHT: 63 IN

## 2022-07-14 DIAGNOSIS — J44.9 MODERATE COPD (CHRONIC OBSTRUCTIVE PULMONARY DISEASE) (HCC): Primary | ICD-10-CM

## 2022-07-14 DIAGNOSIS — C34.2 MALIGNANT NEOPLASM OF MIDDLE LOBE OF RIGHT LUNG (HCC): ICD-10-CM

## 2022-07-14 PROCEDURE — 99214 OFFICE O/P EST MOD 30 MIN: CPT | Performed by: INTERNAL MEDICINE

## 2022-07-14 PROCEDURE — G8400 PT W/DXA NO RESULTS DOC: HCPCS | Performed by: INTERNAL MEDICINE

## 2022-07-14 PROCEDURE — 1123F ACP DISCUSS/DSCN MKR DOCD: CPT | Performed by: INTERNAL MEDICINE

## 2022-07-14 PROCEDURE — G8427 DOCREV CUR MEDS BY ELIG CLIN: HCPCS | Performed by: INTERNAL MEDICINE

## 2022-07-14 PROCEDURE — G8417 CALC BMI ABV UP PARAM F/U: HCPCS | Performed by: INTERNAL MEDICINE

## 2022-07-14 PROCEDURE — 1036F TOBACCO NON-USER: CPT | Performed by: INTERNAL MEDICINE

## 2022-07-14 PROCEDURE — 1090F PRES/ABSN URINE INCON ASSESS: CPT | Performed by: INTERNAL MEDICINE

## 2022-07-14 PROCEDURE — 3017F COLORECTAL CA SCREEN DOC REV: CPT | Performed by: INTERNAL MEDICINE

## 2022-07-14 PROCEDURE — 3023F SPIROM DOC REV: CPT | Performed by: INTERNAL MEDICINE

## 2022-07-14 PROCEDURE — G9899 SCRN MAM PERF RSLTS DOC: HCPCS | Performed by: INTERNAL MEDICINE

## 2022-07-14 RX ORDER — ALBUTEROL SULFATE 90 UG/1
2 AEROSOL, METERED RESPIRATORY (INHALATION) EVERY 4 HOURS PRN
Qty: 18 G | Refills: 5 | Status: SHIPPED | OUTPATIENT
Start: 2022-07-14

## 2022-07-14 NOTE — PROGRESS NOTES
[facility]                                                                CHIEF COMPLAINT: Follow-up COPD         HPI:   Doing okay   Uses Albuterol once a week   No smoking  No cough or sputum  Can not afford Spiriva       From prior visit:   80years old female had CT chest 12/4/2018 for SOB. CT reviewed by me and showed growing RML nodule. Followed by PET scan 3/23/19 reviewed by me and showed no metabolic activities. History of L breast cancer 7-10 years ago underwent surgery followed by radiation. Quit smoking 4 years ago. Smoked 1 ppd for 20 years. No weight loss. Uses Albuterol PRN. Mild CHILDS, able to walk as far as she wants. Dyspnea worse with exertion and better with resting. No associated cough or wheezes.      Past Medical History:   Diagnosis Date    Adenocarcinoma, lung, right ([address] 07/2019    RML    Anesthesia     per radiologist that did breast biopsy, required 3x normal amount of numbing medication     Breast cancer ([address] 2008    radiation and lumpectomy-LEFT    Breast cancer ([address] 2020    right breast 2020    COPD (chronic obstructive pulmonary disease) ([address]     Difficult intravenous access     History of therapeutic radiation     Hx antineoplastic chemo     Hypercholesteremia     Mass of right lung 7/23/2019    Nodule of upper lobe of right lung     PONV (postoperative nausea and vomiting)     S/P thoracotomy 07/2019    RML removed       Past Surgical History:        Procedure Laterality Date    BREAST BIOPSY Right 12/1/2020    RIGHT SENTINEL NODE BIOPSY performed by Frank Child MD at [address] [place] [address] Left 2008    malignant    BREAST LUMPECTOMY Right 2020    malignant    BREAST SURGERY Right 11/17/2020    RIGHT BREAST LUMPECTOMY, PLACEMENT OF BIOZORB MARKER, MAMMOGRAM LATERALITY, 1 TAG, TARGET CALCIFICATIONS (CLIP IF NO RESIDUAL CALCIFICATIONS)  (11-11-20) 10:00AM performed by Liliana Odell Jada Valera MD at [address] Right 11/17/2020    . performed by Argentina Sicard, MD at [facility]  08/11/2008    KIDNEY SURGERY Left     ablation    OVARY REMOVAL Bilateral 04/08/2009    for multiloculated serous cystadenoma    REVISION TOTAL KNEE ARTHROPLASTY Right 07/2016    THORACOSCOPY Right 7/23/2019    Dr. Vineet Oreilly - video assisted w/removal of RML, MSLND    THYROIDECTOMY      PARTIAL    TOTAL KNEE ARTHROPLASTY Right 10/2014    TUMOR EXCISION      from head       Allergies:  has No Known Allergies. Social History:    TOBACCO:   reports that she quit smoking about 7 years ago. Her smoking use included cigarettes. She has a 20.00 pack-year smoking history. She has never used smokeless tobacco.  ETOH:   reports current alcohol use.       Family History:       Problem Relation Age of Onset    Cancer Mother         mutiple myeloma    Cancer Father         colon    Asthma Neg Hx     Diabetes Neg Hx     Emphysema Neg Hx     Heart Failure Neg Hx        Current Medications:    Current Outpatient Medications:     ZINC PO, Take by mouth daily, Disp: , Rfl:     vitamin D (CHOLECALCIFEROL) 68511 UNIT CAPS, Take 50,000 Units by mouth once a week, Disp: , Rfl:     omeprazole (PRILOSEC) 20 MG delayed release capsule, Take 20 mg by mouth every morning (before breakfast), Disp: , Rfl:     tiotropium (SPIRIVA RESPIMAT) 2.5 MCG/ACT AERS inhaler, Inhale 2 puffs into the lungs daily, Disp: 4 g, Rfl: 5    rOPINIRole (REQUIP) 0.25 MG tablet, Take 0.25 mg by mouth 2 times daily, Disp: , Rfl:     albuterol sulfate HFA (VENTOLIN HFA) 108 (90 Base) MCG/ACT inhaler, Inhale 2 puffs into the lungs every 6 hours as needed for Wheezing or Shortness of Breath, Disp: 1 Inhaler, Rfl: 6    vitamin B-12 (CYANOCOBALAMIN) 100 MCG tablet, Take 50 mcg by mouth daily, Disp: , Rfl:     potassium chloride (KLOR-CON) 10 MEQ extended release tablet, Take 10 mEq by mouth , Disp: , Rfl:    simvastatin (ZOCOR) 40 MG tablet, Take 40 mg by mouth nightly, Disp: , Rfl:           Objective:   PHYSICAL EXAM:    /84 (Site: Left Upper Arm, Position: Sitting, Cuff Size: Large Adult)   Pulse 73   Resp 16   Ht 5' 3\" (1.6 m)   Wt 275 lb 9.6 oz (125 kg)   SpO2 95%   BMI 48.82 kg/m²   Gen: No distress. Eyes: PERRL. No sclera icterus. No conjunctival injection. ENT: No discharge. Pharynx clear. Neck: Trachea midline. No obvious mass. Resp: No accessory muscle use. No crackles. No wheezes. No rhonchi. No dullness on percussion. Good air entry. CV: Regular rate. Regular rhythm. No murmur or rub. No edema. GI: Non-tender. Non-distended. No hernia. Skin: Warm and dry. No nodule on exposed extremities. Lymph: No cervical LAD. No supraclavicular LAD. M/S: No cyanosis. No joint deformity. No clubbing. Neuro: Awake. Alert. Moves all four extremities. Psych: Oriented x 3. No anxiety. DATA reviewed by me:   PFTs 04/11/19 FVC (%) FEV1 1.59 (67 %) FEV1/FVC 70% TLC 4.53 (89 %) DLCO 18.01 (80 %) 6MW 1260 F low O2 90%      CT chest 12/4/18   Increased size of 7 mm right middle lobe  3 mm right lower lobe  Lingula opacity scarring versus atelectasis    PET scan 3/23/19  RUL 8 mm nodule with no significant metabolic activities    CT chest 6/13/2019   No evidence of adenopathy  RML 9 mm nodule, increased in size from CT 2/27/2019    CT chest 1/13/2020    Right lower lobe 1.5 x 0.8 cm irregular shaped bandlike opacity    CT chest 4/22/2020   Stable RUL 3 mm nodule  Stable 1.2 cm right adrenal nodule    CT chest 11/4/2020   Stable pulmonary nodules     CT chest 4/22/2021  Stable right lower lobe nodules  No new nodules    CT chest 1/4/2022 imaging   1. Stable appearance of CT examination of the chest with no radiographic  finding to suggest presence of metastatic disease in this patient with  clinical history of lung carcinoma.   2. Interval development of ovoid area of low attenuation adjacent to surgical  clips within the right breast.  Negative Hounsfield unit measurement raises  the possibility that the finding is related to postsurgical fluid collection  and/or area of fat necrosis in association with the prior surgery. Follow-up  correlation with mammographic examination may be helpful for more complete  evaluation. RML lobectomy 7/23/2019   A. Level 10R lymph node:     - One benign lymph node. B. Level 11R lymph node:     - One benign lymph node. C. Right middle lobe lung, lobectomy:     - Invasive well-differentiated pulmonary adenocarcinoma, acinar       predominant - tumor mass is 1.0 cm in maximal dimensions.     - Separate focus of lepidic predominant adenocarcinoma - 0.9 cm in       maximal dimensions.     - Tumor does not involve the pleural surface.     - The bronchial margin and hilar vascular margins are negative.     - Two benign hilar lymph nodes. Assessment:       · Moderate COPD  · RML 9 mm nodule on CT 2/27/19. Grown from CT 11/27/17, however not significantly changed between CT 12/4/18 and PET scan 3/23/19. Negative PET scan 3/23/19- SUV 1. Growing on repeat CT 6/13/2019. RML lobectomy 7/23/2019 showed invasive well-differentiated pulmonary adenocarcinoma [id number]. No recurrence on repeat CT 1/4/22   · Abnormal CT chest 1/13/2020 with right lower lobe irregular shaped bandlike opacity-favoring atelectasis. Stable/smaller on repeat CT chest   · RLL 3 mm nodule stable since 6/13/2019. · History of L breast cancer 7-10 years ago underwent surgery followed by radiation.  Recurrence December 2020 underwent surgery followed by chemotherapy/radiation   · Abnormal right breast imaging on CT chest 1/4/21 - seen by breast surgeon  · 20 pack year smoking- quit 2015       Plan:       · Surveillance CT chest 1/2023   · Albuterol 2 puffs Q4-6 hrs PRN- refills   · D/C Spiriva   · Patient is up to date with pneumococcal vaccine and influenza vaccine   · Advised to take Covid vaccine   · Advised to continue with smoking cessation.   · Advised to follow up with Dr. Sangeetha Zamora    · Follow-up after CT chest

## 2022-11-22 ENCOUNTER — OFFICE VISIT (OUTPATIENT)
Dept: PULMONOLOGY | Age: 72
End: 2022-11-22
Payer: MEDICARE

## 2022-11-22 VITALS
SYSTOLIC BLOOD PRESSURE: 145 MMHG | HEART RATE: 74 BPM | DIASTOLIC BLOOD PRESSURE: 70 MMHG | RESPIRATION RATE: 18 BRPM | WEIGHT: 277 LBS | BODY MASS INDEX: 49.08 KG/M2 | HEIGHT: 63 IN | OXYGEN SATURATION: 93 %

## 2022-11-22 DIAGNOSIS — G47.10 HYPERSOMNIA: ICD-10-CM

## 2022-11-22 DIAGNOSIS — R06.83 SNORING: Primary | ICD-10-CM

## 2022-11-22 DIAGNOSIS — R06.81 WITNESSED EPISODE OF APNEA: ICD-10-CM

## 2022-11-22 DIAGNOSIS — J44.9 MODERATE COPD (CHRONIC OBSTRUCTIVE PULMONARY DISEASE) (HCC): ICD-10-CM

## 2022-11-22 PROCEDURE — 99214 OFFICE O/P EST MOD 30 MIN: CPT | Performed by: INTERNAL MEDICINE

## 2022-11-22 PROCEDURE — G8427 DOCREV CUR MEDS BY ELIG CLIN: HCPCS | Performed by: INTERNAL MEDICINE

## 2022-11-22 PROCEDURE — G9899 SCRN MAM PERF RSLTS DOC: HCPCS | Performed by: INTERNAL MEDICINE

## 2022-11-22 PROCEDURE — G8484 FLU IMMUNIZE NO ADMIN: HCPCS | Performed by: INTERNAL MEDICINE

## 2022-11-22 PROCEDURE — 3023F SPIROM DOC REV: CPT | Performed by: INTERNAL MEDICINE

## 2022-11-22 PROCEDURE — 1036F TOBACCO NON-USER: CPT | Performed by: INTERNAL MEDICINE

## 2022-11-22 PROCEDURE — 3017F COLORECTAL CA SCREEN DOC REV: CPT | Performed by: INTERNAL MEDICINE

## 2022-11-22 PROCEDURE — 1123F ACP DISCUSS/DSCN MKR DOCD: CPT | Performed by: INTERNAL MEDICINE

## 2022-11-22 PROCEDURE — G8417 CALC BMI ABV UP PARAM F/U: HCPCS | Performed by: INTERNAL MEDICINE

## 2022-11-22 PROCEDURE — 1090F PRES/ABSN URINE INCON ASSESS: CPT | Performed by: INTERNAL MEDICINE

## 2022-11-22 PROCEDURE — G8400 PT W/DXA NO RESULTS DOC: HCPCS | Performed by: INTERNAL MEDICINE

## 2022-11-22 ASSESSMENT — SLEEP AND FATIGUE QUESTIONNAIRES
HOW LIKELY ARE YOU TO NOD OFF OR FALL ASLEEP WHILE SITTING QUIETLY AFTER LUNCH WITHOUT ALCOHOL: 3
ESS TOTAL SCORE: 14
NECK CIRCUMFERENCE (INCHES): 17.5
HOW LIKELY ARE YOU TO NOD OFF OR FALL ASLEEP IN A CAR, WHILE STOPPED FOR A FEW MINUTES IN TRAFFIC: 0
HOW LIKELY ARE YOU TO NOD OFF OR FALL ASLEEP WHILE SITTING AND TALKING TO SOMEONE: 0
HOW LIKELY ARE YOU TO NOD OFF OR FALL ASLEEP WHILE SITTING AND READING: 3
HOW LIKELY ARE YOU TO NOD OFF OR FALL ASLEEP WHILE SITTING INACTIVE IN A PUBLIC PLACE: 2
HOW LIKELY ARE YOU TO NOD OFF OR FALL ASLEEP WHILE LYING DOWN TO REST IN THE AFTERNOON WHEN CIRCUMSTANCES PERMIT: 3
HOW LIKELY ARE YOU TO NOD OFF OR FALL ASLEEP WHILE WATCHING TV: 3
HOW LIKELY ARE YOU TO NOD OFF OR FALL ASLEEP WHEN YOU ARE A PASSENGER IN A CAR FOR AN HOUR WITHOUT A BREAK: 0

## 2022-11-22 NOTE — PATIENT INSTRUCTIONS
[facility] [phone]    Sleep Hygiene. .. Tips for better sleep. .. Avoid naps. This will ensure you are sleepy at bedtime. If you have to take a nap, sleep less than 1 hour, before 3 pm.  Sleep only when sleepy; this reduces the time you are awake in bed. Regular exercise is recommended to help you deepen your sleep, but not within 4-6 hours of your bedtime. Timing of exercise is important, aim to exercise early in the morning or early afternoon. A light snack may help you fall asleep. Warm milk and foods high in the amino acid tryptophan, such as bananas, may help you to sleep  Be sure to avoid heavy, spicy or sugary foods 4-6 hours before bedtime and avoid at snack time. Stay away from stimulants such as caffeine and nicotine for at least 4-6 hours before bed. Stimulants can interfere with your ability to fall asleep. Caffeine is found in tea, cola, coffee, cocoa and chocolate and is best avoided at bedtime. Nicotine is found in tobacco products. Avoid alcohol 4-6 hours before bedtime. Alcohol has an immediate sleep-inducing effect, after a few hours when alcohol levels fall there is a stimulant or wake-up effect and will cause fragmented sleep. Sleep rituals are important. Give your body clues it is time to slow down and sleep. Examples include; yoga, deep breathing, listen to relaxing music, a hot bath or a few minutes of reading. Have a fixed bedtime and awakening time, Even on weekends! You will feel better keeping a regular sleep cycle, even if you are retired or not working. Get into your favorite sleep position. If not asleep in 30 minutes, get up and do something boring until you feel sleepy. Remember not to expose yourself to bright lights such as TV, phone or tablet screens. Only use your bed for sleeping. Do not use your bed as an office, workroom or recreation room. Use comfortable bedding. Uncomfortable bedding can prevent good sleep.   Ensure your bedroom is quiet and comfortable. A cooler room along with enough blankets to stay warm is recommended. If your room is too noisy, try a white noise machine. If too bright, try black out shades or an eye mask. Dont take worries to bed. Leave worries about work, school etc. behind you when you go to bed. Some people find it helpful to assign a worry period in the evening or late afternoon to write down your worries and get them out of your system.

## 2022-11-22 NOTE — PROGRESS NOTES
[facility]                                                                CHIEF COMPLAINT: Sleep evaluation        HPI:   Snoring at night for all her life. The severity of snoring is severe. Not sure what makes better or worse. Has observed sleep apnea. No restorative sleep. Patient is complaining of daytime sleepiness, fatigue and tiredness during the day. Bedtime 10 pm and rise time is 5:30 am. Sleep onset 2 minutes. ESS is   No smoking  Uses Albuterol once every 2 days     From prior visit:   80years old female had CT chest 12/4/2018 for SOB. CT reviewed by me and showed growing RML nodule. Followed by PET scan 3/23/19 reviewed by me and showed no metabolic activities. History of L breast cancer 7-10 years ago underwent surgery followed by radiation. Quit smoking 4 years ago. Smoked 1 ppd for 20 years. No weight loss. Uses Albuterol PRN. Mild CHILDS, able to walk as far as she wants. Dyspnea worse with exertion and better with resting. No associated cough or wheezes.      Past Medical History:   Diagnosis Date    Adenocarcinoma, lung, right ([address] 07/2019    RML    Anesthesia     per radiologist that did breast biopsy, required 3x normal amount of numbing medication     Breast cancer ([address] 2008    radiation and lumpectomy-LEFT    Breast cancer ([address] 2020    right breast 2020    COPD (chronic obstructive pulmonary disease) ([address]     Difficult intravenous access     History of therapeutic radiation     Hx antineoplastic chemo     Hypercholesteremia     Mass of right lung 7/23/2019    Nodule of upper lobe of right lung     PONV (postoperative nausea and vomiting)     S/P thoracotomy 07/2019    RML removed       Past Surgical History:        Procedure Laterality Date    BREAST BIOPSY Right 12/1/2020    RIGHT SENTINEL NODE BIOPSY performed by Norm Davis MD at [address] LUMPECTOMY Left 2008    malignant    BREAST LUMPECTOMY Right 2020    malignant    BREAST SURGERY Right 11/17/2020    RIGHT BREAST LUMPECTOMY, PLACEMENT OF BIOZORB MARKER, MAMMOGRAM LATERALITY, 1 TAG, TARGET CALCIFICATIONS (CLIP IF NO RESIDUAL CALCIFICATIONS)  (11-11-20) 10:00AM performed by Norm Davis MD at [address] Right 11/17/2020    . performed by Norm Davis MD at [address] [facility]  08/11/2008    KIDNEY SURGERY Left     ablation    OVARY REMOVAL Bilateral 04/08/2009    for multiloculated serous cystadenoma    REVISION TOTAL KNEE ARTHROPLASTY Right 07/2016    THORACOSCOPY Right 7/23/2019    Dr. Letha Ernst - video assisted w/removal of RML, MSLND    THYROIDECTOMY      PARTIAL    TOTAL KNEE ARTHROPLASTY Right 10/2014    TUMOR EXCISION      from head       Allergies:  has No Known Allergies. Social History:    TOBACCO:   reports that she quit smoking about 7 years ago. Her smoking use included cigarettes. She has a 20.00 pack-year smoking history. She has never used smokeless tobacco.  ETOH:   reports current alcohol use.       Family History:       Problem Relation Age of Onset    Cancer Mother         mutiple myeloma    Cancer Father         colon    Asthma Neg Hx     Diabetes Neg Hx     Emphysema Neg Hx     Heart Failure Neg Hx        Current Medications:    Current Outpatient Medications:     ZINC PO, Take by mouth daily, Disp: , Rfl:     albuterol sulfate HFA (VENTOLIN HFA) 108 (90 Base) MCG/ACT inhaler, Inhale 2 puffs into the lungs every 4 hours as needed for Wheezing or Shortness of Breath, Disp: 18 g, Rfl: 5    vitamin D (CHOLECALCIFEROL) 19103 UNIT CAPS, Take 50,000 Units by mouth once a week, Disp: , Rfl:     omeprazole (PRILOSEC) 20 MG delayed release capsule, Take 20 mg by mouth every morning (before breakfast), Disp: , Rfl:     simvastatin (ZOCOR) 40 MG tablet, Take 40 mg by mouth nightly, Disp: , Rfl:     rOPINIRole (REQUIP) 0.25 MG tablet, Take 0.25 mg by mouth 2 times daily, Disp: , Rfl:     vitamin B-12 (CYANOCOBALAMIN) 100 MCG tablet, Take 50 mcg by mouth daily, Disp: , Rfl:     potassium chloride (KLOR-CON) 10 MEQ extended release tablet, Take 10 mEq by mouth , Disp: , Rfl:           Objective:   PHYSICAL EXAM:    BP (!) 145/70 (Site: Left Upper Arm, Position: Sitting, Cuff Size: Medium Adult)   Pulse 74   Resp 18   Ht 5' 3\" (1.6 m)   Wt 277 lb (125.6 kg)   SpO2 93% Comment: RA  BMI 49.07 kg/m²   Gen: No distress. Morbidly obese  Eyes: PERRL. No sclera icterus. No conjunctival injection. ENT: No discharge. Pharynx clear. Mallampati class IV  Neck: Trachea midline. No obvious mass. Neck size 17.5  Resp: No accessory muscle use. No crackles. No wheezes. No rhonchi. No dullness on percussion. Good air entry. CV: Regular rate. Regular rhythm. No murmur or rub. No edema. GI: Non-tender. Non-distended. No hernia. Skin: Warm and dry. No nodule on exposed extremities. Lymph: No cervical LAD. No supraclavicular LAD. M/S: No cyanosis. No joint deformity. No clubbing. Neuro: Awake. Alert. Moves all four extremities. Psych: Oriented x 3. No anxiety. DATA reviewed by me:   PFTs 04/11/19 FVC (%) FEV1 1.59 (67 %) FEV1/FVC 70% TLC 4.53 (89 %) DLCO 18.01 (80 %) 6MW 1260 F low O2 90%      CT chest 12/4/18   Increased size of 7 mm right middle lobe  3 mm right lower lobe  Lingula opacity scarring versus atelectasis    PET scan 3/23/19  RUL 8 mm nodule with no significant metabolic activities    CT chest 6/13/2019   No evidence of adenopathy  RML 9 mm nodule, increased in size from CT 2/27/2019    CT chest 1/13/2020    Right lower lobe 1.5 x 0.8 cm irregular shaped bandlike opacity    CT chest 4/22/2020   Stable RUL 3 mm nodule  Stable 1.2 cm right adrenal nodule    CT chest 11/4/2020   Stable pulmonary nodules     CT chest 4/22/2021  Stable right lower lobe nodules  No new nodules    CT chest 1/4/2022 imaging   1.  Stable appearance of CT examination of the chest with no radiographic  finding to suggest presence of metastatic disease in this patient with  clinical history of lung carcinoma. 2. Interval development of ovoid area of low attenuation adjacent to surgical  clips within the right breast.  Negative Hounsfield unit measurement raises  the possibility that the finding is related to postsurgical fluid collection  and/or area of fat necrosis in association with the prior surgery. Follow-up  correlation with mammographic examination may be helpful for more complete  evaluation. RML lobectomy 7/23/2019   A. Level 10R lymph node:     - One benign lymph node. B. Level 11R lymph node:     - One benign lymph node. C. Right middle lobe lung, lobectomy:     - Invasive well-differentiated pulmonary adenocarcinoma, acinar       predominant - tumor mass is 1.0 cm in maximal dimensions.     - Separate focus of lepidic predominant adenocarcinoma - 0.9 cm in       maximal dimensions. - Tumor does not involve the pleural surface.     - The bronchial margin and hilar vascular margins are negative. - Two benign hilar lymph nodes. Assessment:       Snoring, hypersomnia and witnessed apnea   Moderate COPD  RML 9 mm nodule on CT 2/27/19. Grown from CT 11/27/17, however not significantly changed between CT 12/4/18 and PET scan 3/23/19. Negative PET scan 3/23/19- SUV 1. Growing on repeat CT 6/13/2019. RML lobectomy 7/23/2019 showed invasive well-differentiated pulmonary adenocarcinoma [id number]. No recurrence on repeat CT 1/4/22   Abnormal CT chest 1/13/2020 with right lower lobe irregular shaped bandlike opacity-favoring atelectasis. Stable/smaller on repeat CT chest   RLL 3 mm nodule stable since 6/13/2019. History of L breast cancer 7-10 years ago underwent surgery followed by radiation.  Recurrence December 2020 underwent surgery followed by chemotherapy/radiation   Abnormal right breast imaging on CT chest 1/4/21 - seen by breast surgeon  20 pack year smoking- quit 2015       Plan: PSG evaluate for sleep related breathing disorder. Treatment options were discussed with patient if PSG reveals SKYLER, including CPAP therapy, oral appliances, upper airway surgery and hypoglossal nerve stimulation. Patient is in favor of CPAP therapy. Discussed with patient the pathophysiology of apnea. Sleep hygiene  Avoid sedatives, alcohol and caffeinated drinks at bed time. No driving motorized vehicles or operating heavy machinery while fatigue, drowsy or sleepy. Weight loss is also recommended as a long-term intervention. Complications of SKYLER if not treated were discussed with patient patient to include systemic hypertension, pulmonary hypertension, cardiovascular morbidities, car accidents and all cause mortality. Surveillance CT chest 1/2023   Albuterol 2 puffs Q4-6 hrs PRN- refills   Patient is up to date with pneumococcal vaccine and influenza vaccine   Advised to continue with smoking cessation.

## 2023-01-26 ENCOUNTER — HOSPITAL ENCOUNTER (OUTPATIENT)
Dept: ULTRASOUND IMAGING | Age: 73
Discharge: HOME | End: 2023-01-26
Payer: MEDICARE

## 2023-01-26 DIAGNOSIS — Z09 FOLLOW-UP EXAM: ICD-10-CM

## 2023-01-26 DIAGNOSIS — R93.89 ABNORMAL ULTRASOUND: ICD-10-CM

## 2023-01-26 PROCEDURE — 76642 ULTRASOUND BREAST LIMITED: CPT

## 2023-02-12 ENCOUNTER — HOSPITAL ENCOUNTER (OUTPATIENT)
Dept: SLEEP CENTER | Age: 73
Discharge: HOME | End: 2023-02-14
Payer: MEDICARE

## 2023-02-12 DIAGNOSIS — R06.83 SNORING: ICD-10-CM

## 2023-02-12 DIAGNOSIS — R06.81 WITNESSED EPISODE OF APNEA: ICD-10-CM

## 2023-02-12 DIAGNOSIS — G47.10 HYPERSOMNIA: ICD-10-CM

## 2023-02-12 PROCEDURE — 95811 POLYSOM 6/>YRS CPAP 4/> PARM: CPT

## 2023-02-13 PROBLEM — R06.81 WITNESSED EPISODE OF APNEA: Status: ACTIVE | Noted: 2023-02-13

## 2023-02-13 PROBLEM — R06.83 SNORING: Status: ACTIVE | Noted: 2023-02-13

## 2023-02-13 PROBLEM — G47.10 HYPERSOMNIA: Status: ACTIVE | Noted: 2023-02-13

## 2023-02-13 PROCEDURE — 95811 POLYSOM 6/>YRS CPAP 4/> PARM: CPT | Performed by: INTERNAL MEDICINE

## 2023-02-21 ENCOUNTER — TELEPHONE (OUTPATIENT)
Dept: PULMONOLOGY | Age: 73
End: 2023-02-21

## 2023-02-21 DIAGNOSIS — G47.33 SEVERE OBSTRUCTIVE SLEEP APNEA: Primary | ICD-10-CM

## 2023-03-09 ENCOUNTER — OFFICE VISIT (OUTPATIENT)
Dept: PULMONOLOGY | Age: 73
End: 2023-03-09
Payer: MEDICARE

## 2023-03-09 ENCOUNTER — HOSPITAL ENCOUNTER (OUTPATIENT)
Dept: CT IMAGING | Age: 73
Discharge: HOME | End: 2023-03-09
Payer: MEDICARE

## 2023-03-09 VITALS
BODY MASS INDEX: 49.26 KG/M2 | DIASTOLIC BLOOD PRESSURE: 78 MMHG | OXYGEN SATURATION: 92 % | RESPIRATION RATE: 16 BRPM | SYSTOLIC BLOOD PRESSURE: 124 MMHG | HEIGHT: 63 IN | WEIGHT: 278 LBS | HEART RATE: 84 BPM

## 2023-03-09 DIAGNOSIS — R93.89 ABNORMAL CT OF THE CHEST: ICD-10-CM

## 2023-03-09 DIAGNOSIS — J44.9 MODERATE COPD (CHRONIC OBSTRUCTIVE PULMONARY DISEASE) (HCC): ICD-10-CM

## 2023-03-09 DIAGNOSIS — R91.1 PULMONARY NODULE: ICD-10-CM

## 2023-03-09 DIAGNOSIS — G47.33 OSA (OBSTRUCTIVE SLEEP APNEA): Primary | ICD-10-CM

## 2023-03-09 PROCEDURE — 99214 OFFICE O/P EST MOD 30 MIN: CPT | Performed by: INTERNAL MEDICINE

## 2023-03-09 PROCEDURE — 1036F TOBACCO NON-USER: CPT | Performed by: INTERNAL MEDICINE

## 2023-03-09 PROCEDURE — G8427 DOCREV CUR MEDS BY ELIG CLIN: HCPCS | Performed by: INTERNAL MEDICINE

## 2023-03-09 PROCEDURE — 71250 CT THORAX DX C-: CPT

## 2023-03-09 PROCEDURE — G8484 FLU IMMUNIZE NO ADMIN: HCPCS | Performed by: INTERNAL MEDICINE

## 2023-03-09 PROCEDURE — 3023F SPIROM DOC REV: CPT | Performed by: INTERNAL MEDICINE

## 2023-03-09 PROCEDURE — 1123F ACP DISCUSS/DSCN MKR DOCD: CPT | Performed by: INTERNAL MEDICINE

## 2023-03-09 PROCEDURE — G8417 CALC BMI ABV UP PARAM F/U: HCPCS | Performed by: INTERNAL MEDICINE

## 2023-03-09 PROCEDURE — 3017F COLORECTAL CA SCREEN DOC REV: CPT | Performed by: INTERNAL MEDICINE

## 2023-03-09 PROCEDURE — G8400 PT W/DXA NO RESULTS DOC: HCPCS | Performed by: INTERNAL MEDICINE

## 2023-03-09 PROCEDURE — 1090F PRES/ABSN URINE INCON ASSESS: CPT | Performed by: INTERNAL MEDICINE

## 2023-03-09 RX ORDER — PREDNISOLONE ACETATE 10 MG/ML
SUSPENSION/ DROPS OPHTHALMIC
COMMUNITY
Start: 2023-02-08

## 2023-03-09 RX ORDER — ANASTROZOLE 1 MG/1
TABLET ORAL DAILY
COMMUNITY

## 2023-03-09 RX ORDER — HYDROCHLOROTHIAZIDE 25 MG/1
TABLET ORAL DAILY
COMMUNITY

## 2023-03-09 RX ORDER — TRAMADOL HYDROCHLORIDE 50 MG/1
TABLET ORAL
COMMUNITY
Start: 2023-01-19

## 2023-03-09 RX ORDER — GANCICLOVIR 1.5 MG/G
GEL OPHTHALMIC
COMMUNITY
Start: 2022-12-20

## 2023-03-09 RX ORDER — LISINOPRIL 10 MG/1
TABLET ORAL
COMMUNITY
Start: 2022-12-01

## 2023-03-09 RX ORDER — ERYTHROMYCIN 5 MG/G
OINTMENT OPHTHALMIC
COMMUNITY
Start: 2022-12-19

## 2023-03-09 RX ORDER — BUDESONIDE AND FORMOTEROL FUMARATE DIHYDRATE 160; 4.5 UG/1; UG/1
AEROSOL RESPIRATORY (INHALATION)
COMMUNITY

## 2023-03-09 ASSESSMENT — SLEEP AND FATIGUE QUESTIONNAIRES
HOW LIKELY ARE YOU TO NOD OFF OR FALL ASLEEP WHILE SITTING QUIETLY AFTER LUNCH WITHOUT ALCOHOL: 3
HOW LIKELY ARE YOU TO NOD OFF OR FALL ASLEEP WHILE SITTING AND TALKING TO SOMEONE: 1
ESS TOTAL SCORE: 19
HOW LIKELY ARE YOU TO NOD OFF OR FALL ASLEEP WHILE SITTING INACTIVE IN A PUBLIC PLACE: 3
HOW LIKELY ARE YOU TO NOD OFF OR FALL ASLEEP WHILE LYING DOWN TO REST IN THE AFTERNOON WHEN CIRCUMSTANCES PERMIT: 3
HOW LIKELY ARE YOU TO NOD OFF OR FALL ASLEEP WHILE SITTING AND READING: 3
HOW LIKELY ARE YOU TO NOD OFF OR FALL ASLEEP WHEN YOU ARE A PASSENGER IN A CAR FOR AN HOUR WITHOUT A BREAK: 2
NECK CIRCUMFERENCE (INCHES): 18
HOW LIKELY ARE YOU TO NOD OFF OR FALL ASLEEP WHILE WATCHING TV: 3
HOW LIKELY ARE YOU TO NOD OFF OR FALL ASLEEP IN A CAR, WHILE STOPPED FOR A FEW MINUTES IN TRAFFIC: 1

## 2023-03-09 NOTE — PROGRESS NOTES
[facility]                                                                CHIEF COMPLAINT: follow up SKYLER/CT        HPI:   Split and CT chest reviewed by me and noted below. Results were dicussed with patient and multiple good questions were answered. Started CPAP 5 days ago and still trying to get used to it. Sleeps much better when using it. Mask and pressure are good. No smoking  Uses Albuterol once a week     From prior visit:   80years old female had CT chest 12/4/2018 for SOB. CT reviewed by me and showed growing RML nodule. Followed by PET scan 3/23/19 reviewed by me and showed no metabolic activities. History of L breast cancer 7-10 years ago underwent surgery followed by radiation. Quit smoking 4 years ago. Smoked 1 ppd for 20 years. No weight loss. Uses Albuterol PRN. Mild CHILDS, able to walk as far as she wants. Dyspnea worse with exertion and better with resting. No associated cough or wheezes.      Past Medical History:   Diagnosis Date    Adenocarcinoma, lung, right ([address] 07/2019    RML    Anesthesia     per radiologist that did breast biopsy, required 3x normal amount of numbing medication     Breast cancer ([address] 2008    radiation and lumpectomy-LEFT    Breast cancer ([address] 2020    right breast 2020    COPD (chronic obstructive pulmonary disease) ([address]     Difficult intravenous access     History of therapeutic radiation     Hx antineoplastic chemo     Hypercholesteremia     Mass of right lung 7/23/2019    Nodule of upper lobe of right lung     PONV (postoperative nausea and vomiting)     S/P thoracotomy 07/2019    RML removed       Past Surgical History:        Procedure Laterality Date    BREAST BIOPSY Right 12/1/2020    RIGHT SENTINEL NODE BIOPSY performed by Holly Evans MD at [address] LUMPECTOMY Left 2008    malignant    BREAST LUMPECTOMY Right 2020    malignant    BREAST SURGERY Right 11/17/2020    RIGHT BREAST LUMPECTOMY, PLACEMENT OF BIOZORB MARKER, MAMMOGRAM LATERALITY, 1 TAG, TARGET CALCIFICATIONS (CLIP IF NO RESIDUAL CALCIFICATIONS)  (11-11-20) 10:00AM performed by Francoise Felipe MD at [address] Right 11/17/2020    . performed by Francoise Felipe MD at [address] [facility]  08/11/2008    KIDNEY SURGERY Left     ablation    OVARY REMOVAL Bilateral 04/08/2009    for multiloculated serous cystadenoma    REVISION TOTAL KNEE ARTHROPLASTY Right 07/2016    THORACOSCOPY Right 7/23/2019    Dr. Nova Lambert - video assisted w/removal of RML, MSLND    THYROIDECTOMY      PARTIAL    TOTAL KNEE ARTHROPLASTY Right 10/2014    TUMOR EXCISION      from head       Allergies:  has No Known Allergies. Social History:    TOBACCO:   reports that she quit smoking about 7 years ago. Her smoking use included cigarettes. She has a 20.00 pack-year smoking history. She has never used smokeless tobacco.  ETOH:   reports current alcohol use.       Family History:       Problem Relation Age of Onset    Cancer Mother         mutiple myeloma    Cancer Father         colon    Asthma Neg Hx     Diabetes Neg Hx     Emphysema Neg Hx     Heart Failure Neg Hx        Current Medications:    Current Outpatient Medications:     anastrozole (ARIMIDEX) 1 MG tablet, Take by mouth daily, Disp: , Rfl:     budesonide-formoterol (SYMBICORT) 160-4.5 MCG/ACT AERO, Budesonide-Formoterol HFA Inhaler 160 mcg-4.5 mcg/actuation        active, Disp: , Rfl:     erythromycin (ROMYCIN) 5 MG/GM ophthalmic ointment, , Disp: , Rfl:     ZIRGAN 0.15 % GEL ophthalmic gel, , Disp: , Rfl:     hydroCHLOROthiazide (HYDRODIURIL) 25 MG tablet, Take by mouth daily, Disp: , Rfl:     lisinopril (PRINIVIL;ZESTRIL) 10 MG tablet, , Disp: , Rfl:     prednisoLONE acetate (PRED FORTE) 1 % ophthalmic suspension, , Disp: , Rfl:     traMADol (ULTRAM) 50 MG tablet, , Disp: , Rfl:     ZINC PO, Take by mouth daily, Disp: , Rfl:     albuterol sulfate HFA (VENTOLIN HFA) 108 (90 Base) MCG/ACT inhaler, Inhale 2 puffs into the lungs every 4 hours as needed for Wheezing or Shortness of Breath, Disp: 18 g, Rfl: 5    vitamin D (CHOLECALCIFEROL) 85257 UNIT CAPS, Take 50,000 Units by mouth once a week, Disp: , Rfl:     omeprazole (PRILOSEC) 20 MG delayed release capsule, Take 20 mg by mouth every morning (before breakfast), Disp: , Rfl:     rOPINIRole (REQUIP) 0.25 MG tablet, Take 0.25 mg by mouth 2 times daily, Disp: , Rfl:     vitamin B-12 (CYANOCOBALAMIN) 100 MCG tablet, Take 50 mcg by mouth daily, Disp: , Rfl:     potassium chloride (KLOR-CON) 10 MEQ extended release tablet, Take 10 mEq by mouth , Disp: , Rfl:     simvastatin (ZOCOR) 40 MG tablet, Take 40 mg by mouth nightly, Disp: , Rfl:           Objective:   PHYSICAL EXAM:    /78 (Site: Left Upper Arm, Position: Sitting, Cuff Size: Large Adult)   Pulse 84   Resp 16   Ht 5' 3\" (1.6 m)   Wt 278 lb (126.1 kg)   SpO2 92%   BMI 49.25 kg/m²   Gen: No distress. Morbidly obese BMI 49.25  Eyes: PERRL. No sclera icterus. No conjunctival injection. ENT: No discharge. Pharynx clear. Mallampati class IV  Neck: Trachea midline. No obvious mass. Neck size 17.5  Resp: No accessory muscle use. No crackles. No wheezes. No rhonchi. No dullness on percussion. Good air entry. CV: Regular rate. Regular rhythm. No murmur or rub. No edema. GI: Non-tender. Non-distended. No hernia. Skin: Warm and dry. No nodule on exposed extremities. Lymph: No cervical LAD. No supraclavicular LAD. M/S: No cyanosis. No joint deformity. No clubbing. Neuro: Awake. Alert. Moves all four extremities. Psych: Oriented x 3. No anxiety.            DATA reviewed by me:   PFTs 04/11/19 FVC (%) FEV1 1.59 (67 %) FEV1/FVC 70% TLC 4.53 (89 %) DLCO 18.01 (80 %) 6MW 1260 F low O2 90%      CT chest 12/4/18   Increased size of 7 mm right middle lobe  3 mm right lower lobe  Lingula opacity scarring versus atelectasis    PET scan 3/23/19  RUL 8 mm nodule with no significant metabolic activities    CT chest 6/13/2019   No evidence of adenopathy  RML 9 mm nodule, increased in size from CT 2/27/2019    CT chest 1/13/2020    Right lower lobe 1.5 x 0.8 cm irregular shaped bandlike opacity    CT chest 4/22/2020   Stable RUL 3 mm nodule  Stable 1.2 cm right adrenal nodule    CT chest 11/4/2020   Stable pulmonary nodules     CT chest 4/22/2021  Stable right lower lobe nodules  No new nodules    CT chest 1/4/2022 imaging   1. Stable appearance of CT examination of the chest with no radiographic  finding to suggest presence of metastatic disease in this patient with  clinical history of lung carcinoma. 2. Interval development of ovoid area of low attenuation adjacent to surgical  clips within the right breast.  Negative Hounsfield unit measurement raises  the possibility that the finding is related to postsurgical fluid collection  and/or area of fat necrosis in association with the prior surgery. Follow-up  correlation with mammographic examination may be helpful for more complete  evaluation. CT chest 3/9/2023 imaging reviewed by me and showed  No significant changes on my read     RML lobectomy 7/23/2019   A. Level 10R lymph node:     - One benign lymph node. B. Level 11R lymph node:     - One benign lymph node. C. Right middle lobe lung, lobectomy:     - Invasive well-differentiated pulmonary adenocarcinoma, acinar       predominant - tumor mass is 1.0 cm in maximal dimensions.     - Separate focus of lepidic predominant adenocarcinoma - 0.9 cm in       maximal dimensions. - Tumor does not involve the pleural surface.     - The bronchial margin and hilar vascular margins are negative. - Two benign hilar lymph nodes. Split-night 2/12/2023 AHI 88.9 and desaturation to 39% controlled with CPAP 13 cm H2O, PLMS      Assessment:       Severe SKYLER. CPAP 13 cm H2O. Started CPAP with clinical improvement. Moderate COPD  RML 9 mm nodule on CT 2/27/19.  Grown from CT 11/27/17, however not significantly changed between CT 12/4/18 and PET scan 3/23/19. Negative PET scan 3/23/19- SUV 1. Growing on repeat CT 6/13/2019. RML lobectomy 7/23/2019 showed invasive well-differentiated pulmonary adenocarcinoma [id number]. No recurrence on repeat CT  Abnormal CT chest 1/13/2020 with right lower lobe irregular shaped bandlike opacity-favoring atelectasis. Stable/smaller on repeat CT chest   RLL 3 mm nodule stable since 6/13/2019. History of L breast cancer 7-10 years ago underwent surgery followed by radiation. Recurrence December 2020 underwent surgery followed by chemotherapy/radiation   Abnormal right breast imaging on CT chest 1/4/21 - seen by breast surgeon  20 pack year smoking- quit 2015       Plan:       Order for CPAP supplies  Advised to use CPAP 6-8 hrs at night and during naps. Replacement of mask, tubing, head straps every 3-6 months or sooner if damaged. Follow up CPAP compliance and pressure adjustment if needed  Sleep hygiene  Avoid sedatives, alcohol and caffeinated drinks at bed time. No driving motorized vehicles or operating heavy machinery while fatigue, drowsy or sleepy. Weight loss is also recommended as a long-term intervention. Albuterol 2 puffs Q4-6 hrs PRN- refills   Surveillance CT chest March 2024   Patient is up to date with pneumococcal vaccine and influenza vaccine   Advised to continue with smoking cessation.   Follow up in 1 year or sooner if needed

## 2023-05-03 ENCOUNTER — OFFICE VISIT (OUTPATIENT)
Dept: PULMONOLOGY | Age: 73
End: 2023-05-03
Payer: MEDICARE

## 2023-05-03 ENCOUNTER — TELEPHONE (OUTPATIENT)
Dept: PULMONOLOGY | Age: 73
End: 2023-05-03

## 2023-05-03 VITALS
SYSTOLIC BLOOD PRESSURE: 118 MMHG | DIASTOLIC BLOOD PRESSURE: 64 MMHG | WEIGHT: 268 LBS | HEART RATE: 98 BPM | BODY MASS INDEX: 47.48 KG/M2 | HEIGHT: 63 IN | OXYGEN SATURATION: 93 %

## 2023-05-03 DIAGNOSIS — G47.33 OSA (OBSTRUCTIVE SLEEP APNEA): Primary | ICD-10-CM

## 2023-05-03 DIAGNOSIS — R91.1 PULMONARY NODULE: ICD-10-CM

## 2023-05-03 DIAGNOSIS — J44.9 MODERATE COPD (CHRONIC OBSTRUCTIVE PULMONARY DISEASE) (HCC): ICD-10-CM

## 2023-05-03 PROCEDURE — 99214 OFFICE O/P EST MOD 30 MIN: CPT | Performed by: INTERNAL MEDICINE

## 2023-05-03 PROCEDURE — 1036F TOBACCO NON-USER: CPT | Performed by: INTERNAL MEDICINE

## 2023-05-03 PROCEDURE — 1090F PRES/ABSN URINE INCON ASSESS: CPT | Performed by: INTERNAL MEDICINE

## 2023-05-03 PROCEDURE — 3017F COLORECTAL CA SCREEN DOC REV: CPT | Performed by: INTERNAL MEDICINE

## 2023-05-03 PROCEDURE — G8427 DOCREV CUR MEDS BY ELIG CLIN: HCPCS | Performed by: INTERNAL MEDICINE

## 2023-05-03 PROCEDURE — 1123F ACP DISCUSS/DSCN MKR DOCD: CPT | Performed by: INTERNAL MEDICINE

## 2023-05-03 PROCEDURE — G8417 CALC BMI ABV UP PARAM F/U: HCPCS | Performed by: INTERNAL MEDICINE

## 2023-05-03 PROCEDURE — G8400 PT W/DXA NO RESULTS DOC: HCPCS | Performed by: INTERNAL MEDICINE

## 2023-05-03 PROCEDURE — 3023F SPIROM DOC REV: CPT | Performed by: INTERNAL MEDICINE

## 2023-05-03 ASSESSMENT — SLEEP AND FATIGUE QUESTIONNAIRES
HOW LIKELY ARE YOU TO NOD OFF OR FALL ASLEEP WHILE LYING DOWN TO REST IN THE AFTERNOON WHEN CIRCUMSTANCES PERMIT: 3
HOW LIKELY ARE YOU TO NOD OFF OR FALL ASLEEP WHILE SITTING AND READING: 3
HOW LIKELY ARE YOU TO NOD OFF OR FALL ASLEEP WHILE SITTING AND TALKING TO SOMEONE: 0
HOW LIKELY ARE YOU TO NOD OFF OR FALL ASLEEP WHEN YOU ARE A PASSENGER IN A CAR FOR AN HOUR WITHOUT A BREAK: 1
HOW LIKELY ARE YOU TO NOD OFF OR FALL ASLEEP WHILE WATCHING TV: 3
NECK CIRCUMFERENCE (INCHES): 16.75
HOW LIKELY ARE YOU TO NOD OFF OR FALL ASLEEP WHILE SITTING INACTIVE IN A PUBLIC PLACE: 3
ESS TOTAL SCORE: 16
HOW LIKELY ARE YOU TO NOD OFF OR FALL ASLEEP IN A CAR, WHILE STOPPED FOR A FEW MINUTES IN TRAFFIC: 0
HOW LIKELY ARE YOU TO NOD OFF OR FALL ASLEEP WHILE SITTING QUIETLY AFTER LUNCH WITHOUT ALCOHOL: 3

## 2023-05-03 NOTE — PROGRESS NOTES
[facility]                                                                CHIEF COMPLAINT: follow up SKYLER        HPI:   Doing well with her CPAP. Uses every night, 6-10 hrs. Mask and pressure are good. No nodding off when driving. Goes to bed 10 pm and gets up 7:30 am. Sleep onset 2 min. No smoking  Uses Albuterol once a day    From prior visit:   80years old female had CT chest 12/4/2018 for SOB. CT reviewed by me and showed growing RML nodule. Followed by PET scan 3/23/19 reviewed by me and showed no metabolic activities. History of L breast cancer 7-10 years ago underwent surgery followed by radiation. Quit smoking 4 years ago. Smoked 1 ppd for 20 years. No weight loss. Uses Albuterol PRN. Mild CHILDS, able to walk as far as she wants. Dyspnea worse with exertion and better with resting. No associated cough or wheezes.      Past Medical History:   Diagnosis Date    Adenocarcinoma, lung, right ([address] 07/2019    RML    Anesthesia     per radiologist that did breast biopsy, required 3x normal amount of numbing medication     Breast cancer ([address] 2008    radiation and lumpectomy-LEFT    Breast cancer ([address] 2020    right breast 2020    COPD (chronic obstructive pulmonary disease) ([address]     Difficult intravenous access     History of therapeutic radiation     Hx antineoplastic chemo     Hypercholesteremia     Mass of right lung 7/23/2019    Nodule of upper lobe of right lung     PONV (postoperative nausea and vomiting)     S/P thoracotomy 07/2019    RML removed       Past Surgical History:        Procedure Laterality Date    BREAST BIOPSY Right 12/1/2020    RIGHT SENTINEL NODE BIOPSY performed by Mikael Villagomez MD at [address] LUMPECTOMY Left 2008    malignant    BREAST LUMPECTOMY Right 2020    malignant    BREAST SURGERY Right 11/17/2020    RIGHT BREAST LUMPECTOMY, PLACEMENT OF BIOZORB MARKER, MAMMOGRAM LATERALITY, 1 TAG, TARGET

## 2023-06-05 ENCOUNTER — TELEPHONE (OUTPATIENT)
Dept: PULMONOLOGY | Age: 73
End: 2023-06-05

## 2023-06-05 DIAGNOSIS — J44.9 MODERATE COPD (CHRONIC OBSTRUCTIVE PULMONARY DISEASE) (HCC): Primary | ICD-10-CM

## 2024-03-18 ENCOUNTER — TELEPHONE (OUTPATIENT)
Dept: PULMONOLOGY | Age: 74
End: 2024-03-18

## 2024-03-18 DIAGNOSIS — R91.1 PULMONARY NODULE: ICD-10-CM

## 2024-03-18 DIAGNOSIS — J44.9 MODERATE COPD (CHRONIC OBSTRUCTIVE PULMONARY DISEASE) (HCC): Primary | ICD-10-CM

## 2024-03-18 DIAGNOSIS — G47.33 OSA (OBSTRUCTIVE SLEEP APNEA): ICD-10-CM

## 2024-03-18 NOTE — TELEPHONE ENCOUNTER
Pt called to r/s her CT but [facility] stated that since she wanted to be scheduled in may that we need a new order due to it will be . Please sign order

## 2024-06-13 ENCOUNTER — TELEPHONE (OUTPATIENT)
Dept: PULMONOLOGY | Age: 74
End: 2024-06-13

## 2024-06-13 ENCOUNTER — OFFICE VISIT (OUTPATIENT)
Dept: PULMONOLOGY | Age: 74
End: 2024-06-13
Payer: MEDICARE

## 2024-06-13 ENCOUNTER — HOSPITAL ENCOUNTER (OUTPATIENT)
Dept: CT IMAGING | Age: 74
Discharge: HOME | End: 2024-06-13
Attending: INTERNAL MEDICINE
Payer: MEDICARE

## 2024-06-13 VITALS
RESPIRATION RATE: 18 BRPM | OXYGEN SATURATION: 93 % | SYSTOLIC BLOOD PRESSURE: 124 MMHG | BODY MASS INDEX: 46.53 KG/M2 | HEART RATE: 73 BPM | DIASTOLIC BLOOD PRESSURE: 60 MMHG | WEIGHT: 262.6 LBS | HEIGHT: 63 IN

## 2024-06-13 DIAGNOSIS — J44.9 MODERATE COPD (CHRONIC OBSTRUCTIVE PULMONARY DISEASE) (HCC): ICD-10-CM

## 2024-06-13 DIAGNOSIS — G47.33 OSA (OBSTRUCTIVE SLEEP APNEA): ICD-10-CM

## 2024-06-13 DIAGNOSIS — R91.1 PULMONARY NODULE: ICD-10-CM

## 2024-06-13 DIAGNOSIS — G47.33 OSA (OBSTRUCTIVE SLEEP APNEA): Primary | ICD-10-CM

## 2024-06-13 PROCEDURE — 1090F PRES/ABSN URINE INCON ASSESS: CPT | Performed by: INTERNAL MEDICINE

## 2024-06-13 PROCEDURE — 3017F COLORECTAL CA SCREEN DOC REV: CPT | Performed by: INTERNAL MEDICINE

## 2024-06-13 PROCEDURE — 99214 OFFICE O/P EST MOD 30 MIN: CPT | Performed by: INTERNAL MEDICINE

## 2024-06-13 PROCEDURE — 1036F TOBACCO NON-USER: CPT | Performed by: INTERNAL MEDICINE

## 2024-06-13 PROCEDURE — G8400 PT W/DXA NO RESULTS DOC: HCPCS | Performed by: INTERNAL MEDICINE

## 2024-06-13 PROCEDURE — 1123F ACP DISCUSS/DSCN MKR DOCD: CPT | Performed by: INTERNAL MEDICINE

## 2024-06-13 PROCEDURE — 3023F SPIROM DOC REV: CPT | Performed by: INTERNAL MEDICINE

## 2024-06-13 PROCEDURE — G8417 CALC BMI ABV UP PARAM F/U: HCPCS | Performed by: INTERNAL MEDICINE

## 2024-06-13 PROCEDURE — 71250 CT THORAX DX C-: CPT

## 2024-06-13 PROCEDURE — G8427 DOCREV CUR MEDS BY ELIG CLIN: HCPCS | Performed by: INTERNAL MEDICINE

## 2024-06-13 RX ORDER — TIOTROPIUM BROMIDE INHALATION SPRAY 1.56 UG/1
2 SPRAY, METERED RESPIRATORY (INHALATION) DAILY
Qty: 1 EACH | Refills: 3 | Status: SHIPPED | OUTPATIENT
Start: 2024-06-13

## 2024-06-13 RX ORDER — ACETAZOLAMIDE 250 MG/1
TABLET ORAL
COMMUNITY
Start: 2023-10-09

## 2024-06-13 RX ORDER — TIOTROPIUM BROMIDE 18 UG/1
CAPSULE ORAL; RESPIRATORY (INHALATION)
COMMUNITY
End: 2024-06-13

## 2024-06-13 ASSESSMENT — SLEEP AND FATIGUE QUESTIONNAIRES
NECK CIRCUMFERENCE (INCHES): 17.5
HOW LIKELY ARE YOU TO NOD OFF OR FALL ASLEEP WHILE SITTING INACTIVE IN A PUBLIC PLACE: SLIGHT CHANCE OF DOZING
HOW LIKELY ARE YOU TO NOD OFF OR FALL ASLEEP WHILE SITTING AND READING: MODERATE CHANCE OF DOZING
HOW LIKELY ARE YOU TO NOD OFF OR FALL ASLEEP IN A CAR, WHILE STOPPED FOR A FEW MINUTES IN TRAFFIC: WOULD NEVER DOZE
HOW LIKELY ARE YOU TO NOD OFF OR FALL ASLEEP WHILE LYING DOWN TO REST IN THE AFTERNOON WHEN CIRCUMSTANCES PERMIT: HIGH CHANCE OF DOZING
ESS TOTAL SCORE: 9
HOW LIKELY ARE YOU TO NOD OFF OR FALL ASLEEP WHILE SITTING QUIETLY AFTER LUNCH WITHOUT ALCOHOL: SLIGHT CHANCE OF DOZING
HOW LIKELY ARE YOU TO NOD OFF OR FALL ASLEEP WHILE SITTING AND TALKING TO SOMEONE: WOULD NEVER DOZE
HOW LIKELY ARE YOU TO NOD OFF OR FALL ASLEEP WHILE WATCHING TV: MODERATE CHANCE OF DOZING
HOW LIKELY ARE YOU TO NOD OFF OR FALL ASLEEP WHEN YOU ARE A PASSENGER IN A CAR FOR AN HOUR WITHOUT A BREAK: WOULD NEVER DOZE

## 2024-06-13 NOTE — PROGRESS NOTES
[facility]                                                                CHIEF COMPLAINT: follow up SKYLER        HPI:   Doing well with her CPAP. Uses every night, 7-8 hrs. Mask and pressure are good. No nodding off when driving. Goes to bed 10 pm and gets up 7:30 am. Sleep onset few min.  ESS 9.   No smoking  No cough or sputum  Uses spriva daily  Uses Albuterol 0-2 times/day     From prior visit:   68 years old female had CT chest 12/4/2018 for SOB. CT reviewed by me and showed growing RML nodule. Followed by PET scan 3/23/19 reviewed by me and showed no metabolic activities. History of L breast cancer 7-10 years ago underwent surgery followed by radiation. Quit smoking 4 years ago. Smoked 1 ppd for 20 years. No weight loss. Uses Albuterol PRN. Mild CHILDS, able to walk as far as she wants. Dyspnea worse with exertion and better with resting. No associated cough or wheezes.     Past Medical History:   Diagnosis Date    Adenocarcinoma, lung, right ([facility]) 07/2019    RML    Anesthesia     per radiologist that did breast biopsy, required 3x normal amount of numbing medication     Breast cancer ([facility]) 2008    radiation and lumpectomy-LEFT    Breast cancer ([facility]) 2020    right breast 2020    COPD (chronic obstructive pulmonary disease) ([facility])     Difficult intravenous access     History of therapeutic radiation     Hx antineoplastic chemo     Hypercholesteremia     Mass of right lung 7/23/2019    Nodule of upper lobe of right lung     PONV (postoperative nausea and vomiting)     S/P thoracotomy 07/2019    RML removed       Past Surgical History:        Procedure Laterality Date    BREAST BIOPSY Right 12/1/2020    RIGHT SENTINEL NODE BIOPSY performed by Mayi Leon MD at [facility] OR    BREAST LUMPECTOMY Left 2008    malignant    BREAST LUMPECTOMY Right 2020    malignant    BREAST SURGERY Right 11/17/2020    RIGHT BREAST LUMPECTOMY, PLACEMENT OF

## 2024-06-13 NOTE — TELEPHONE ENCOUNTER
Faxed heated tubing order, full face mask order, CR, and ov [place] to [facility] at [phone] via [employer]ax.

## 2025-08-06 ASSESSMENT — SLEEP AND FATIGUE QUESTIONNAIRES
HOW LIKELY ARE YOU TO NOD OFF OR FALL ASLEEP WHILE SITTING AND TALKING TO SOMEONE: WOULD NEVER DOZE
HOW LIKELY ARE YOU TO NOD OFF OR FALL ASLEEP WHILE WATCHING TV: MODERATE CHANCE OF DOZING
HOW LIKELY ARE YOU TO NOD OFF OR FALL ASLEEP WHEN YOU ARE A PASSENGER IN A CAR FOR AN HOUR WITHOUT A BREAK: SLIGHT CHANCE OF DOZING
HOW LIKELY ARE YOU TO NOD OFF OR FALL ASLEEP WHILE LYING DOWN TO REST IN THE AFTERNOON WHEN CIRCUMSTANCES PERMIT: MODERATE CHANCE OF DOZING
HOW LIKELY ARE YOU TO NOD OFF OR FALL ASLEEP WHILE SITTING AND READING: MODERATE CHANCE OF DOZING
HOW LIKELY ARE YOU TO NOD OFF OR FALL ASLEEP WHEN YOU ARE A PASSENGER IN A CAR FOR AN HOUR WITHOUT A BREAK: SLIGHT CHANCE OF DOZING
HOW LIKELY ARE YOU TO NOD OFF OR FALL ASLEEP WHILE WATCHING TV: MODERATE CHANCE OF DOZING
HOW LIKELY ARE YOU TO NOD OFF OR FALL ASLEEP WHILE SITTING AND TALKING TO SOMEONE: WOULD NEVER DOZE
HOW LIKELY ARE YOU TO NOD OFF OR FALL ASLEEP WHILE SITTING QUIETLY AFTER LUNCH WITHOUT ALCOHOL: SLIGHT CHANCE OF DOZING
HOW LIKELY ARE YOU TO NOD OFF OR FALL ASLEEP WHILE SITTING QUIETLY AFTER LUNCH WITHOUT ALCOHOL: SLIGHT CHANCE OF DOZING
HOW LIKELY ARE YOU TO NOD OFF OR FALL ASLEEP WHILE SITTING AND READING: MODERATE CHANCE OF DOZING
HOW LIKELY ARE YOU TO NOD OFF OR FALL ASLEEP IN A CAR, WHILE STOPPED FOR A FEW MINUTES IN TRAFFIC: WOULD NEVER DOZE
HOW LIKELY ARE YOU TO NOD OFF OR FALL ASLEEP WHILE SITTING INACTIVE IN A PUBLIC PLACE: SLIGHT CHANCE OF DOZING
HOW LIKELY ARE YOU TO NOD OFF OR FALL ASLEEP WHILE LYING DOWN TO REST IN THE AFTERNOON WHEN CIRCUMSTANCES PERMIT: MODERATE CHANCE OF DOZING
HOW LIKELY ARE YOU TO NOD OFF OR FALL ASLEEP WHILE SITTING INACTIVE IN A PUBLIC PLACE: SLIGHT CHANCE OF DOZING
HOW LIKELY ARE YOU TO NOD OFF OR FALL ASLEEP IN A CAR, WHILE STOPPED FOR A FEW MINUTES IN TRAFFIC: WOULD NEVER DOZE
ESS TOTAL SCORE: 9

## 2025-08-07 ENCOUNTER — OFFICE VISIT (OUTPATIENT)
Dept: PULMONOLOGY | Age: 75
End: 2025-08-07
Payer: MEDICARE

## 2025-08-07 VITALS
HEART RATE: 86 BPM | BODY MASS INDEX: 47.88 KG/M2 | RESPIRATION RATE: 16 BRPM | OXYGEN SATURATION: 93 % | SYSTOLIC BLOOD PRESSURE: 131 MMHG | DIASTOLIC BLOOD PRESSURE: 60 MMHG | WEIGHT: 270.2 LBS | HEIGHT: 63 IN

## 2025-08-07 DIAGNOSIS — J44.9 MODERATE COPD (CHRONIC OBSTRUCTIVE PULMONARY DISEASE) (HCC): ICD-10-CM

## 2025-08-07 DIAGNOSIS — G47.33 OSA (OBSTRUCTIVE SLEEP APNEA): Primary | ICD-10-CM

## 2025-08-07 PROCEDURE — G8427 DOCREV CUR MEDS BY ELIG CLIN: HCPCS | Performed by: INTERNAL MEDICINE

## 2025-08-07 PROCEDURE — 3017F COLORECTAL CA SCREEN DOC REV: CPT | Performed by: INTERNAL MEDICINE

## 2025-08-07 PROCEDURE — 1090F PRES/ABSN URINE INCON ASSESS: CPT | Performed by: INTERNAL MEDICINE

## 2025-08-07 PROCEDURE — G8417 CALC BMI ABV UP PARAM F/U: HCPCS | Performed by: INTERNAL MEDICINE

## 2025-08-07 PROCEDURE — G8400 PT W/DXA NO RESULTS DOC: HCPCS | Performed by: INTERNAL MEDICINE

## 2025-08-07 PROCEDURE — 1159F MED LIST DOCD IN RCRD: CPT | Performed by: INTERNAL MEDICINE

## 2025-08-07 PROCEDURE — 4004F PT TOBACCO SCREEN RCVD TLK: CPT | Performed by: INTERNAL MEDICINE

## 2025-08-07 PROCEDURE — 1123F ACP DISCUSS/DSCN MKR DOCD: CPT | Performed by: INTERNAL MEDICINE

## 2025-08-07 PROCEDURE — 3023F SPIROM DOC REV: CPT | Performed by: INTERNAL MEDICINE

## 2025-08-07 PROCEDURE — 99214 OFFICE O/P EST MOD 30 MIN: CPT | Performed by: INTERNAL MEDICINE

## 2025-08-07 RX ORDER — TERCONAZOLE 4 MG/G
CREAM VAGINAL
COMMUNITY
Start: 2025-07-24

## 2025-08-07 RX ORDER — FLUCONAZOLE 150 MG/1
TABLET ORAL
COMMUNITY
Start: 2025-06-23

## 2025-08-07 RX ORDER — SELENIUM 50 MCG
1 TABLET ORAL DAILY
COMMUNITY
Start: 2025-06-17

## 2025-08-07 RX ORDER — NYSTATIN 100000 [USP'U]/ML
500000 SUSPENSION ORAL 4 TIMES DAILY
COMMUNITY
Start: 2025-05-25

## 2025-08-07 RX ORDER — FLUTICASONE FUROATE AND VILANTEROL 100; 25 UG/1; UG/1
1 POWDER RESPIRATORY (INHALATION) DAILY
COMMUNITY

## 2025-08-07 RX ORDER — SULFAMETHOXAZOLE AND TRIMETHOPRIM 800; 160 MG/1; MG/1
1 TABLET ORAL DAILY
COMMUNITY

## 2025-08-07 RX ORDER — TERBINAFINE HYDROCHLORIDE 250 MG/1
250 TABLET ORAL DAILY
COMMUNITY
Start: 2025-03-17

## 2025-08-07 RX ORDER — ASCORBIC ACID 100 MG
TABLET,CHEWABLE ORAL DAILY
COMMUNITY

## 2025-08-07 RX ORDER — ASCORBIC ACID 500 MG
500 TABLET ORAL DAILY
COMMUNITY

## 2025-08-07 RX ORDER — BACLOFEN 10 MG/1
10 TABLET ORAL 3 TIMES DAILY
COMMUNITY
Start: 2025-02-12

## 2025-08-07 RX ORDER — NAPHAZOLINE HCL/GLYCERIN 0.012-0.2%
1 DROPS OPHTHALMIC (EYE) DAILY
COMMUNITY
Start: 2025-06-17

## (undated) DEVICE — SURGICAL SET UP - SURE SET: Brand: MEDLINE INDUSTRIES, INC.

## (undated) DEVICE — STERILE LATEX POWDER-FREE SURGICAL GLOVESWITH NITRILE COATING: Brand: PROTEXIS

## (undated) DEVICE — STANDARD HYPODERMIC NEEDLE,POLYPROPYLENE HUB: Brand: MONOJECT

## (undated) DEVICE — PROBE LOCALIZER W/DRAPE

## (undated) DEVICE — PLATE ES AD W 9FT CRD 2

## (undated) DEVICE — SUTURE PERMA-HAND SZ 0 L18IN NONABSORBABLE BLK L30MM FSL 678G

## (undated) DEVICE — GOWN,SIRUS,POLYRNF,BRTHSLV,LG,30/CS: Brand: MEDLINE

## (undated) DEVICE — SURE SET-DOUBLE BASIN-LF: Brand: MEDLINE INDUSTRIES, INC.

## (undated) DEVICE — INTENDED USE FOR SURGICAL MARKING ON INTACT SKIN, ALSO PROVIDES A PERMANENT METHOD OF IDENTIFYING OBJECTS IN THE OPERATING ROOM: Brand: WRITESITE® PLUS MINI PREP RESISTANT MARKER

## (undated) DEVICE — COVER LT HNDL BLU PLAS

## (undated) DEVICE — DRAIN,WOUND,15FR,3/16,FULL-FLUTED: Brand: MEDLINE

## (undated) DEVICE — THE MARKER IS A RADIOGRAPHIC IMPLANTABLE MARKER USED TO MARK SOFT TISSUE.IT IS COMPRISED OF A BIOABSORBABLE SPACER THAT HOLDS RADIOPAQUE MARKER CLIPS.: Brand: BIOZORB MARKER

## (undated) DEVICE — SUTURE VCRL SZ 3-0 L27IN ABSRB UD L26MM SH 1/2 CIR J416H

## (undated) DEVICE — AEGIS 1" DISK 4MM HOLE, PEEL OPEN: Brand: MEDLINE

## (undated) DEVICE — TROCAR: Brand: KII FIOS FIRST ENTRY

## (undated) DEVICE — Device

## (undated) DEVICE — STAPLER INT L16CM STD UNIV RELD DISP TRI-STAPLE ENDO GIA

## (undated) DEVICE — DRAPE,CHEST,FENES,15X10,STERIL: Brand: MEDLINE

## (undated) DEVICE — SUTURE VCRL SZ 4-0 L18IN ABSRB UD L19MM PS-2 3/8 CIR PRIM J496H

## (undated) DEVICE — DRAIN SURG SGL COLL PT TB FOR ATS BG OASIS

## (undated) DEVICE — ELECTROSURGICAL PENCIL ROCKER SWITCH NON COATED BLADE ELECTRODE 10 FT (3 M) CORD HOLSTER: Brand: MEGADYNE

## (undated) DEVICE — 3M™ TEGADERM™ TRANSPARENT FILM DRESSING FRAME STYLE, 1624W, 2-3/8 IN X 2-3/4 IN (6 CM X 7 CM), 100/CT 4CT/CASE: Brand: 3M™ TEGADERM™

## (undated) DEVICE — BLANKET WRM W40.2XL55.9IN IORT LO BODY + MISTRAL AIR

## (undated) DEVICE — SYRINGE MED 50ML LUERLOCK TIP

## (undated) DEVICE — E-Z CLEAN, NON-STICK, PTFE COATED, ELECTROSURGICAL BLADE ELECTRODE, MODIFIED EXTENDED INSULATION, 2.5 INCH (6.35 CM): Brand: MEGADYNE

## (undated) DEVICE — SYRINGE MED 30ML STD CLR PLAS LUERLOCK TIP N CTRL DISP

## (undated) DEVICE — ANTI-FOG SOLUTION WITH FOAM PAD: Brand: DEVON

## (undated) DEVICE — GARMENT,MEDLINE,DVT,INT,CALF,MED, GEN2: Brand: MEDLINE

## (undated) DEVICE — CATHETER THOR 28FR SIL 6 EYE STR HI TEAR STRENGTH

## (undated) DEVICE — BLADE ES L4IN INSUL EDGE

## (undated) DEVICE — ADHESIVE SKIN CLSR 0.7ML TOP DERMBND ADV

## (undated) DEVICE — JEWISH HOSPITAL TURNOVER KIT: Brand: MEDLINE INDUSTRIES, INC.

## (undated) DEVICE — TROCAR: Brand: KII SLEEVE

## (undated) DEVICE — STERILE POLYISOPRENE POWDER-FREE SURGICAL GLOVES WITH EMOLLIENT COATING: Brand: PROTEXIS

## (undated) DEVICE — SOLUTION IV IRRIG POUR BRL 0.9% SODIUM CHL 2F7124

## (undated) DEVICE — APPLICATOR MEDICATED 26 CC SOLUTION HI LT ORNG CHLORAPREP

## (undated) DEVICE — SUTURE PERMA-HAND SZ 2-0 L30IN NONABSORBABLE BLK L30MM FSL 679H

## (undated) DEVICE — DRAPE,T,LAPARO,TRANS,STERILE: Brand: MEDLINE

## (undated) DEVICE — RELOAD STPL L45MM VASCULAR/MEDIUM TISS TAN CRV TIP ARTC

## (undated) DEVICE — Z INACTIVE USE 2641837 CLIP LIG M BLU TI HRT SHP WIRE HORZ 600 PER BX

## (undated) DEVICE — CONTAINER SPEC TRNSPRT DUBLIN

## (undated) DEVICE — GLOVE ORANGE PI 7 1/2   MSG9075

## (undated) DEVICE — SUTURE MCRYL + SZ 4-0 L18IN ABSRB UD L19MM PS-2 3/8 CIR MCP496G

## (undated) DEVICE — CATHETER THOR L21IN DIA28FR R ANG SFT RADPQ STRP SIL

## (undated) DEVICE — CHLORAPREP 26ML ORANGE

## (undated) DEVICE — COVER US PRB W15XL120CM W/ GEL RUBBERBAND TAPE STRP FLD GEN

## (undated) DEVICE — SPECIMEN ORIENTATION CHARMS, SIX DISTINCTLY SHAPED STERILE 10MM CHARMS: Brand: MARGINMAP

## (undated) DEVICE — SUTURE NONABSORBABLE MONOFILAMENT 4-0 RB-1 36 IN BLU PROLENE 8557H

## (undated) DEVICE — INTENDED FOR TISSUE SEPARATION, AND OTHER PROCEDURES THAT REQUIRE A SHARP SURGICAL BLADE TO PUNCTURE OR CUT.: Brand: BARD-PARKER ® CARBON RIB-BACK BLADES

## (undated) DEVICE — RESERVOIR,SUCTION,100CC,SILICONE: Brand: MEDLINE